# Patient Record
Sex: FEMALE | Race: WHITE | Employment: FULL TIME | URBAN - METROPOLITAN AREA
[De-identification: names, ages, dates, MRNs, and addresses within clinical notes are randomized per-mention and may not be internally consistent; named-entity substitution may affect disease eponyms.]

---

## 2017-02-08 ENCOUNTER — TELEPHONE (OUTPATIENT)
Dept: FAMILY MEDICINE CLINIC | Age: 37
End: 2017-02-08

## 2017-06-02 ENCOUNTER — TELEPHONE (OUTPATIENT)
Dept: FAMILY MEDICINE CLINIC | Age: 37
End: 2017-06-02

## 2017-06-02 DIAGNOSIS — G89.29 CHRONIC CERVICAL PAIN: Primary | ICD-10-CM

## 2017-06-02 DIAGNOSIS — M54.2 CHRONIC CERVICAL PAIN: Primary | ICD-10-CM

## 2019-01-26 ENCOUNTER — HOSPITAL ENCOUNTER (EMERGENCY)
Age: 39
Discharge: HOME | End: 2019-01-26
Attending: EMERGENCY MEDICINE
Payer: COMMERCIAL

## 2019-01-26 VITALS
WEIGHT: 220 LBS | TEMPERATURE: 98 F | SYSTOLIC BLOOD PRESSURE: 148 MMHG | BODY MASS INDEX: 35.51 KG/M2 | OXYGEN SATURATION: 98 % | DIASTOLIC BLOOD PRESSURE: 80 MMHG | RESPIRATION RATE: 16 BRPM | HEART RATE: 90 BPM

## 2019-01-26 DIAGNOSIS — G43.909 MIGRAINE WITHOUT STATUS MIGRAINOSUS, NOT INTRACTABLE, UNSPECIFIED MIGRAINE TYPE: Primary | ICD-10-CM

## 2019-01-26 DIAGNOSIS — Z86.69 HX OF MIGRAINES: ICD-10-CM

## 2019-01-26 LAB
A/G RATIO: 1.4 (ref 1.1–2.2)
ALBUMIN SERPL-MCNC: 4 G/DL (ref 3.4–5)
ALP BLD-CCNC: 59 U/L (ref 40–129)
ALT SERPL-CCNC: 8 U/L (ref 10–40)
ANION GAP SERPL CALCULATED.3IONS-SCNC: 10 MMOL/L (ref 3–16)
AST SERPL-CCNC: 15 U/L (ref 15–37)
BASOPHILS ABSOLUTE: 0.1 K/UL (ref 0–0.2)
BASOPHILS RELATIVE PERCENT: 0.7 %
BILIRUB SERPL-MCNC: 0.3 MG/DL (ref 0–1)
BUN BLDV-MCNC: 13 MG/DL (ref 7–20)
CALCIUM SERPL-MCNC: 9.3 MG/DL (ref 8.3–10.6)
CHLORIDE BLD-SCNC: 104 MMOL/L (ref 99–110)
CO2: 25 MMOL/L (ref 21–32)
CREAT SERPL-MCNC: 0.7 MG/DL (ref 0.6–1.1)
EOSINOPHILS ABSOLUTE: 0.4 K/UL (ref 0–0.6)
EOSINOPHILS RELATIVE PERCENT: 3.6 %
GFR AFRICAN AMERICAN: >60
GFR NON-AFRICAN AMERICAN: >60
GLOBULIN: 2.9 G/DL
GLUCOSE BLD-MCNC: 93 MG/DL (ref 70–99)
HCG QUALITATIVE: NEGATIVE
HCT VFR BLD CALC: 38 % (ref 36–48)
HEMOGLOBIN: 12.7 G/DL (ref 12–16)
LYMPHOCYTES ABSOLUTE: 1.9 K/UL (ref 1–5.1)
LYMPHOCYTES RELATIVE PERCENT: 17.3 %
MCH RBC QN AUTO: 27.8 PG (ref 26–34)
MCHC RBC AUTO-ENTMCNC: 33.5 G/DL (ref 31–36)
MCV RBC AUTO: 83.1 FL (ref 80–100)
MONOCYTES ABSOLUTE: 0.6 K/UL (ref 0–1.3)
MONOCYTES RELATIVE PERCENT: 5.4 %
NEUTROPHILS ABSOLUTE: 7.9 K/UL (ref 1.7–7.7)
NEUTROPHILS RELATIVE PERCENT: 73 %
PDW BLD-RTO: 13.1 % (ref 12.4–15.4)
PLATELET # BLD: 382 K/UL (ref 135–450)
PMV BLD AUTO: 7.5 FL (ref 5–10.5)
POTASSIUM SERPL-SCNC: 3.9 MMOL/L (ref 3.5–5.1)
RBC # BLD: 4.58 M/UL (ref 4–5.2)
SODIUM BLD-SCNC: 139 MMOL/L (ref 136–145)
TOTAL PROTEIN: 6.9 G/DL (ref 6.4–8.2)
WBC # BLD: 10.8 K/UL (ref 4–11)

## 2019-01-26 PROCEDURE — 2580000003 HC RX 258: Performed by: PHYSICIAN ASSISTANT

## 2019-01-26 PROCEDURE — 96375 TX/PRO/DX INJ NEW DRUG ADDON: CPT

## 2019-01-26 PROCEDURE — 6360000002 HC RX W HCPCS: Performed by: PHYSICIAN ASSISTANT

## 2019-01-26 PROCEDURE — 6370000000 HC RX 637 (ALT 250 FOR IP): Performed by: PHYSICIAN ASSISTANT

## 2019-01-26 PROCEDURE — 85025 COMPLETE CBC W/AUTO DIFF WBC: CPT

## 2019-01-26 PROCEDURE — 84703 CHORIONIC GONADOTROPIN ASSAY: CPT

## 2019-01-26 PROCEDURE — 99284 EMERGENCY DEPT VISIT MOD MDM: CPT

## 2019-01-26 PROCEDURE — 96374 THER/PROPH/DIAG INJ IV PUSH: CPT

## 2019-01-26 PROCEDURE — 96361 HYDRATE IV INFUSION ADD-ON: CPT

## 2019-01-26 PROCEDURE — 80053 COMPREHEN METABOLIC PANEL: CPT

## 2019-01-26 RX ORDER — IBUPROFEN 600 MG/1
600 TABLET ORAL EVERY 6 HOURS PRN
Qty: 10 TABLET | Refills: 0 | Status: SHIPPED | OUTPATIENT
Start: 2019-01-26 | End: 2021-06-30

## 2019-01-26 RX ORDER — METOCLOPRAMIDE HYDROCHLORIDE 5 MG/ML
10 INJECTION INTRAMUSCULAR; INTRAVENOUS ONCE
Status: COMPLETED | OUTPATIENT
Start: 2019-01-26 | End: 2019-01-26

## 2019-01-26 RX ORDER — BUTALBITAL, ACETAMINOPHEN AND CAFFEINE 50; 325; 40 MG/1; MG/1; MG/1
2 TABLET ORAL ONCE
Status: COMPLETED | OUTPATIENT
Start: 2019-01-26 | End: 2019-01-26

## 2019-01-26 RX ORDER — ONDANSETRON 2 MG/ML
4 INJECTION INTRAMUSCULAR; INTRAVENOUS ONCE
Status: COMPLETED | OUTPATIENT
Start: 2019-01-26 | End: 2019-01-26

## 2019-01-26 RX ORDER — 0.9 % SODIUM CHLORIDE 0.9 %
1000 INTRAVENOUS SOLUTION INTRAVENOUS ONCE
Status: COMPLETED | OUTPATIENT
Start: 2019-01-26 | End: 2019-01-26

## 2019-01-26 RX ORDER — LIDOCAINE HYDROCHLORIDE 10 MG/ML
5 INJECTION, SOLUTION INFILTRATION; PERINEURAL ONCE
Status: DISCONTINUED | OUTPATIENT
Start: 2019-01-26 | End: 2019-01-26 | Stop reason: HOSPADM

## 2019-01-26 RX ORDER — BUTALBITAL, ACETAMINOPHEN AND CAFFEINE 50; 325; 40 MG/1; MG/1; MG/1
1 TABLET ORAL EVERY 6 HOURS PRN
Qty: 10 TABLET | Refills: 0 | Status: SHIPPED | OUTPATIENT
Start: 2019-01-26 | End: 2021-06-30

## 2019-01-26 RX ORDER — DIPHENHYDRAMINE HYDROCHLORIDE 50 MG/ML
25 INJECTION INTRAMUSCULAR; INTRAVENOUS ONCE
Status: COMPLETED | OUTPATIENT
Start: 2019-01-26 | End: 2019-01-26

## 2019-01-26 RX ORDER — KETOROLAC TROMETHAMINE 30 MG/ML
30 INJECTION, SOLUTION INTRAMUSCULAR; INTRAVENOUS ONCE
Status: COMPLETED | OUTPATIENT
Start: 2019-01-26 | End: 2019-01-26

## 2019-01-26 RX ORDER — PROMETHAZINE HYDROCHLORIDE 25 MG/1
12.5-25 TABLET ORAL EVERY 8 HOURS PRN
Qty: 10 TABLET | Refills: 0 | Status: SHIPPED | OUTPATIENT
Start: 2019-01-26 | End: 2019-02-02

## 2019-01-26 RX ADMIN — BUTALBITAL, ACETAMINOPHEN, AND CAFFEINE 2 TABLET: 50; 325; 40 TABLET ORAL at 15:18

## 2019-01-26 RX ADMIN — METOCLOPRAMIDE 10 MG: 5 INJECTION, SOLUTION INTRAMUSCULAR; INTRAVENOUS at 13:33

## 2019-01-26 RX ADMIN — KETOROLAC TROMETHAMINE 30 MG: 30 INJECTION, SOLUTION INTRAMUSCULAR; INTRAVENOUS at 13:33

## 2019-01-26 RX ADMIN — DIPHENHYDRAMINE HYDROCHLORIDE 25 MG: 50 INJECTION, SOLUTION INTRAMUSCULAR; INTRAVENOUS at 13:33

## 2019-01-26 RX ADMIN — SODIUM CHLORIDE 1000 ML: 9 INJECTION, SOLUTION INTRAVENOUS at 13:20

## 2019-01-26 RX ADMIN — ONDANSETRON 4 MG: 2 INJECTION INTRAMUSCULAR; INTRAVENOUS at 15:18

## 2019-01-26 ASSESSMENT — PAIN SCALES - GENERAL
PAINLEVEL_OUTOF10: 9

## 2019-07-30 ENCOUNTER — HOSPITAL ENCOUNTER (EMERGENCY)
Age: 39
Discharge: LEFT BEFORE DISCHARGE | End: 2019-07-30
Payer: COMMERCIAL

## 2019-07-30 VITALS
DIASTOLIC BLOOD PRESSURE: 75 MMHG | HEART RATE: 91 BPM | SYSTOLIC BLOOD PRESSURE: 115 MMHG | HEIGHT: 66 IN | TEMPERATURE: 98.3 F | BODY MASS INDEX: 30.53 KG/M2 | WEIGHT: 190 LBS | OXYGEN SATURATION: 99 % | RESPIRATION RATE: 16 BRPM

## 2019-07-30 DIAGNOSIS — M79.643 PAIN OF HAND, UNSPECIFIED LATERALITY: Primary | ICD-10-CM

## 2019-07-30 PROCEDURE — 4500000002 HC ER NO CHARGE

## 2019-07-30 ASSESSMENT — PAIN DESCRIPTION - DESCRIPTORS: DESCRIPTORS: THROBBING;SHARP

## 2019-07-30 ASSESSMENT — PAIN DESCRIPTION - LOCATION: LOCATION: HAND

## 2019-07-30 ASSESSMENT — PAIN DESCRIPTION - FREQUENCY: FREQUENCY: CONTINUOUS

## 2019-07-30 ASSESSMENT — PAIN DESCRIPTION - ORIENTATION: ORIENTATION: RIGHT

## 2019-07-30 ASSESSMENT — PAIN DESCRIPTION - PAIN TYPE: TYPE: ACUTE PAIN

## 2019-07-30 ASSESSMENT — PAIN SCALES - GENERAL: PAINLEVEL_OUTOF10: 5

## 2021-06-30 ENCOUNTER — HOSPITAL ENCOUNTER (EMERGENCY)
Age: 41
Discharge: HOME | End: 2021-06-30
Payer: COMMERCIAL

## 2021-06-30 ENCOUNTER — APPOINTMENT (OUTPATIENT)
Dept: GENERAL RADIOLOGY | Age: 41
End: 2021-06-30
Payer: COMMERCIAL

## 2021-06-30 VITALS
RESPIRATION RATE: 16 BRPM | WEIGHT: 195 LBS | SYSTOLIC BLOOD PRESSURE: 115 MMHG | OXYGEN SATURATION: 97 % | HEIGHT: 66 IN | BODY MASS INDEX: 31.34 KG/M2 | HEART RATE: 82 BPM | TEMPERATURE: 97.1 F | DIASTOLIC BLOOD PRESSURE: 78 MMHG

## 2021-06-30 DIAGNOSIS — M76.62 TENDONITIS, ACHILLES, LEFT: Primary | ICD-10-CM

## 2021-06-30 PROCEDURE — 73610 X-RAY EXAM OF ANKLE: CPT

## 2021-06-30 PROCEDURE — 99283 EMERGENCY DEPT VISIT LOW MDM: CPT

## 2021-06-30 RX ORDER — NAPROXEN 500 MG/1
500 TABLET ORAL 2 TIMES DAILY WITH MEALS
Qty: 20 TABLET | Refills: 0 | Status: SHIPPED | OUTPATIENT
Start: 2021-06-30 | End: 2021-07-10

## 2021-06-30 ASSESSMENT — PAIN DESCRIPTION - ORIENTATION: ORIENTATION: LEFT

## 2021-06-30 ASSESSMENT — PAIN DESCRIPTION - LOCATION: LOCATION: ANKLE

## 2021-06-30 ASSESSMENT — PAIN DESCRIPTION - FREQUENCY: FREQUENCY: CONTINUOUS

## 2021-06-30 ASSESSMENT — PAIN SCALES - GENERAL: PAINLEVEL_OUTOF10: 4

## 2021-06-30 NOTE — ED PROVIDER NOTES
[address] ED  EMERGENCY DEPARTMENT ENCOUNTER        Pt Name: Manas Haq  MRN: [id number]  [place] 1980  Date of evaluation: 6/30/2021  Provider: Ann Arreguin PA-C  PCP: No primary care provider on file. Shared Visit or Autonomous Visit: GINA. I have evaluated this patient. My supervising physician was available for consultation. CHIEF COMPLAINT       Chief Complaint   Patient presents with    Ankle Pain     and achilles pain left foot       HISTORY OF PRESENT ILLNESS   (Location/Symptom, Timing/Onset, Context/Setting, Quality, Duration, Modifying Factors, Severity)  Note limiting factors. Manas Haq is a 47 y.o. female presenting to the emergency department for evaluation of pain at her posterior left ankle Achilles area states has bothered her off and on for years every few months will get pain but usually goes away pretty quickly pain started again yesterday and not going away feels worse than usual.  No falls or trauma. No calf pain or swelling. No discoloration. Flexing and extending foot. Intact sensation. The history is provided by the patient. Ankle Problem  Location:  Ankle  Injury: no    Ankle location:  L ankle  Pain details:     Onset quality:  Gradual    Progression:  Worsening  Chronicity:  Recurrent  Worsened by: Activity, extension and flexion  Associated symptoms: no fever and no numbness        Nursing Notes were reviewed    REVIEW OF SYSTEMS    (2-9 systems for level 4, 10 or more for level 5)     Review of Systems   Constitutional: Negative for fever. Musculoskeletal:        Left foot/ ankle pain   Skin: Negative for color change and wound. Neurological: Negative for numbness. Positives and Pertinent negatives as per HPI.        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     Pain management contract agreement     Viral meningitis 2010 SURGICAL HISTORY     Past Surgical History:   Procedure Laterality Date    CARDIAC SURGERY      CARDIAC SURGERY      repaired hole in heart at age 5 year    ELBOW SURGERY      broken, plates in elbow   [address]       Discharge Medication List as of 6/30/2021 12:47 PM            ALLERGIES     Penicillins    FAMILYHISTORY       Family History   Problem Relation Age of Onset    Alcohol Abuse Mother           SOCIAL HISTORY       Social History     Socioeconomic History    Marital status: Single     Spouse name: None    Number of children: None    Years of education: None    Highest education level: None   Occupational History    None   Tobacco Use    Smoking status: Never Smoker    Smokeless tobacco: Never Used   Vaping Use    Vaping Use: Never used   Substance and Sexual Activity    Alcohol use: No    Drug use: No    Sexual activity: Yes     Partners: Male   Other Topics Concern    None   Social History Narrative    None     Social Determinants of Health     Financial Resource Strain:     Difficulty of Paying Living Expenses:    Food Insecurity:     Worried About Running Out of Food in the Last Year:     Ran Out of Food in the Last Year:    Transportation Needs:     Lack of Transportation (Medical):      Lack of Transportation (Non-Medical):    Physical Activity:     Days of Exercise per Week:     Minutes of Exercise per Session:    Stress:     Feeling of Stress :    Social Connections:     Frequency of Communication with Friends and Family:     Frequency of Social Gatherings with Friends and Family:     Attends Voodoo Services:     Active Member of Clubs or Organizations:     Attends Club or Organization Meetings:     Marital Status:    Intimate Partner Violence:     Fear of Current or Ex-Partner:     Emotionally Abused:     Physically Abused:     Sexually Abused:        SCREENINGS             PHYSICAL EXAM    (up to 7 for level 4, 8 or more for level 5)     ED Triage Vitals [06/30/21 1057]   BP Temp Temp src Pulse Resp SpO2 Height Weight   115/78 97.1 °F (36.2 °C) -- 82 16 97 % 5' 6\" (1.676 m) 195 lb (88.5 kg)       Physical Exam  Vitals and nursing note reviewed. Constitutional:       Appearance: She is well-developed. She is not ill-appearing or toxic-appearing. HENT:      Head: Normocephalic and atraumatic. Cardiovascular:      Pulses: Normal pulses. Dorsalis pedis pulses are 2+ on the left side. Posterior tibial pulses are 2+ on the left side. Pulmonary:      Effort: Pulmonary effort is normal. No respiratory distress. Musculoskeletal:      Comments: Tenderness to palpation posterior left ankle over Achilles with mild swelling. No palpable defect. No erythema. No open wounds. No palpable crepitus. Full flexion-extension of foot. Intact sensation distal pulses 2+ cap refill less than 2 seconds. No calf tenderness or swelling. Skin:     General: Skin is warm and dry. Capillary Refill: Capillary refill takes less than 2 seconds. Neurological:      Mental Status: She is alert and oriented to person, place, and time. Sensory: Sensation is intact. Motor: Motor function is intact. No abnormal muscle tone. Psychiatric:         Behavior: Behavior normal.         DIAGNOSTIC RESULTS   LABS:    Labs Reviewed - No data to display    All other labs were within normal range or not returned as of this dictation. EKG: All EKG's are interpreted by the Emergency Department Physician in the absence of a cardiologist.  Please see their note for interpretation of EKG.       RADIOLOGY:   Non-plain film images such as CT, Ultrasound and MRI are read by the radiologist. Plain radiographic images are visualized andpreliminarily interpreted by the  ED Provider with the below findings:        Interpretation perthe Radiologist below, if available at the time of this note:    XR ANKLE LEFT (MIN 3 VIEWS)   Final Result   No evidence of acute osseous abnormality. XR ANKLE LEFT (MIN 3 VIEWS)    Result Date: 7/1/2021  EXAMINATION: THREE XRAY VIEWS OF THE LEFT ANKLE 6/30/2021 11:36 am COMPARISON: None. HISTORY: ORDERING SYSTEM PROVIDED HISTORY: pain at Achilles off and on x months TECHNOLOGIST PROVIDED HISTORY: Reason for exam:->pain at Achilles off and on x months Reason for Exam: achilles pain, no known injury Acuity: Acute Type of Exam: Initial FINDINGS: There is mild soft tissue swelling about the ankle. Question of presence of very minimal spurring along posterior margin of the calcaneus. No other significant bone or joint abnormality is identified. No evidence of significant joint effusion. No evidence of acute fracture or dislocation. No evidence of acute osseous abnormality. PROCEDURES   Unless otherwise noted below, none     Procedures    CRITICAL CARE TIME   N/A    CONSULTS:  None      EMERGENCY DEPARTMENT COURSE and DIFFERENTIAL DIAGNOSIS/MDM:   Vitals:    Vitals:    06/30/21 1057   BP: 115/78   Pulse: 82   Resp: 16   Temp: 97.1 °F (36.2 °C)   SpO2: 97%   Weight: 195 lb (88.5 kg)   Height: 5' 6\" (1.676 m)       Patient was given thefollowing medications:  Medications - No data to display    12:25 PM EDT  X-ray obtained no fracture or dislocation questionable minimal spurring calcaneus. Suspect Achilles tendinitis. Advise rest ice elevate anti-inflammatories crutches as needed close follow-up. Advise returning for worsening. I estimate there is LOW risk for FRACTURE, COMPARTMENT SYNDROME, DEEP VENOUS THROMBOSIS, SEPTIC ARTHRITIS, TENDON OR NEUROVASCULAR INJURY, thus I consider the discharge disposition reasonable. FINAL IMPRESSION      1.  Tendonitis, Achilles, left          DISPOSITION/PLAN   DISPOSITION     PATIENT REFERREDTO:  Smiley Bah MD  [address] [facility] [address]  [phone]    Call in 1 day  [facility] orthopedics call for follow-up appointment    [place] ([facility]) Pre-Services  [phone]    Primary care referral      DISCHARGE MEDICATIONS:  Discharge Medication List as of 6/30/2021 12:47 PM      START taking these medications    Details   naproxen (NAPROSYN) 500 MG tablet Take 1 tablet by mouth 2 times daily (with meals) for 10 days, Disp-20 tablet, R-0Print             DISCONTINUED MEDICATIONS:  Discharge Medication List as of 6/30/2021 12:47 PM      STOP taking these medications       butalbital-acetaminophen-caffeine (FIORICET, ESGIC) -40 MG per tablet Comments:   Reason for Stopping:         ibuprofen (ADVIL;MOTRIN) 600 MG tablet Comments:   Reason for Stopping:                      (Please note that portions ofthis note were completed with a voice recognition program.  Efforts were made to edit the dictations but occasionally words are mis-transcribed.)    Julian Judd PA-C (electronically signed)            Isabelle Reardon PA-C  07/02/21 0031

## 2021-07-02 ASSESSMENT — ENCOUNTER SYMPTOMS: COLOR CHANGE: 0

## 2021-10-19 ENCOUNTER — HOSPITAL ENCOUNTER (EMERGENCY)
Age: 41
Discharge: HOME | End: 2021-10-19
Attending: STUDENT IN AN ORGANIZED HEALTH CARE EDUCATION/TRAINING PROGRAM
Payer: COMMERCIAL

## 2021-10-19 ENCOUNTER — APPOINTMENT (OUTPATIENT)
Dept: CT IMAGING | Age: 41
End: 2021-10-19
Payer: COMMERCIAL

## 2021-10-19 ENCOUNTER — APPOINTMENT (OUTPATIENT)
Dept: GENERAL RADIOLOGY | Age: 41
End: 2021-10-19
Payer: COMMERCIAL

## 2021-10-19 VITALS
WEIGHT: 195 LBS | BODY MASS INDEX: 31.34 KG/M2 | HEART RATE: 75 BPM | RESPIRATION RATE: 16 BRPM | TEMPERATURE: 97.6 F | HEIGHT: 66 IN | SYSTOLIC BLOOD PRESSURE: 115 MMHG | DIASTOLIC BLOOD PRESSURE: 71 MMHG | OXYGEN SATURATION: 99 %

## 2021-10-19 DIAGNOSIS — R06.02 SHORTNESS OF BREATH: ICD-10-CM

## 2021-10-19 DIAGNOSIS — U07.1 COVID-19: Primary | ICD-10-CM

## 2021-10-19 LAB
A/G RATIO: 1 (ref 1.1–2.2)
ALBUMIN SERPL-MCNC: 4 G/DL (ref 3.4–5)
ALP BLD-CCNC: 64 U/L (ref 40–129)
ALT SERPL-CCNC: 8 U/L (ref 10–40)
ANION GAP SERPL CALCULATED.3IONS-SCNC: 10 MMOL/L (ref 3–16)
AST SERPL-CCNC: 24 U/L (ref 15–37)
BASOPHILS ABSOLUTE: 0.1 K/UL (ref 0–0.2)
BASOPHILS RELATIVE PERCENT: 0.9 %
BILIRUB SERPL-MCNC: 0.4 MG/DL (ref 0–1)
BUN BLDV-MCNC: 11 MG/DL (ref 7–20)
CALCIUM SERPL-MCNC: 9 MG/DL (ref 8.3–10.6)
CHLORIDE BLD-SCNC: 103 MMOL/L (ref 99–110)
CO2: 24 MMOL/L (ref 21–32)
CREAT SERPL-MCNC: <0.5 MG/DL (ref 0.6–1.1)
EKG ATRIAL RATE: 61 BPM
EKG DIAGNOSIS: NORMAL
EKG P AXIS: 80 DEGREES
EKG P-R INTERVAL: 208 MS
EKG Q-T INTERVAL: 442 MS
EKG QRS DURATION: 102 MS
EKG QTC CALCULATION (BAZETT): 444 MS
EKG R AXIS: 63 DEGREES
EKG T AXIS: 51 DEGREES
EKG VENTRICULAR RATE: 61 BPM
EOSINOPHILS ABSOLUTE: 0.3 K/UL (ref 0–0.6)
EOSINOPHILS RELATIVE PERCENT: 5.3 %
GFR AFRICAN AMERICAN: >60
GFR NON-AFRICAN AMERICAN: >60
GLOBULIN: 4 G/DL
GLUCOSE BLD-MCNC: 80 MG/DL (ref 70–99)
HCG QUALITATIVE: NEGATIVE
HCT VFR BLD CALC: 42.1 % (ref 36–48)
HEMOGLOBIN: 13.9 G/DL (ref 12–16)
LYMPHOCYTES ABSOLUTE: 2.5 K/UL (ref 1–5.1)
LYMPHOCYTES RELATIVE PERCENT: 37.8 %
MCH RBC QN AUTO: 27.2 PG (ref 26–34)
MCHC RBC AUTO-ENTMCNC: 32.9 G/DL (ref 31–36)
MCV RBC AUTO: 82.7 FL (ref 80–100)
MONOCYTES ABSOLUTE: 0.4 K/UL (ref 0–1.3)
MONOCYTES RELATIVE PERCENT: 6.3 %
NEUTROPHILS ABSOLUTE: 3.3 K/UL (ref 1.7–7.7)
NEUTROPHILS RELATIVE PERCENT: 49.7 %
PDW BLD-RTO: 12.9 % (ref 12.4–15.4)
PLATELET # BLD: 479 K/UL (ref 135–450)
PMV BLD AUTO: 7.2 FL (ref 5–10.5)
POTASSIUM REFLEX MAGNESIUM: 5.3 MMOL/L (ref 3.5–5.1)
POTASSIUM SERPL-SCNC: 4.1 MMOL/L (ref 3.5–5.1)
PROCALCITONIN: 0.03 NG/ML (ref 0–0.15)
RBC # BLD: 5.09 M/UL (ref 4–5.2)
SODIUM BLD-SCNC: 137 MMOL/L (ref 136–145)
TOTAL PROTEIN: 8 G/DL (ref 6.4–8.2)
TROPONIN: <0.01 NG/ML
WBC # BLD: 6.6 K/UL (ref 4–11)

## 2021-10-19 PROCEDURE — 99285 EMERGENCY DEPT VISIT HI MDM: CPT

## 2021-10-19 PROCEDURE — 80053 COMPREHEN METABOLIC PANEL: CPT

## 2021-10-19 PROCEDURE — 84484 ASSAY OF TROPONIN QUANT: CPT

## 2021-10-19 PROCEDURE — 71260 CT THORAX DX C+: CPT

## 2021-10-19 PROCEDURE — 85025 COMPLETE CBC W/AUTO DIFF WBC: CPT

## 2021-10-19 PROCEDURE — 84145 PROCALCITONIN (PCT): CPT

## 2021-10-19 PROCEDURE — 84132 ASSAY OF SERUM POTASSIUM: CPT

## 2021-10-19 PROCEDURE — 84703 CHORIONIC GONADOTROPIN ASSAY: CPT

## 2021-10-19 PROCEDURE — 71046 X-RAY EXAM CHEST 2 VIEWS: CPT

## 2021-10-19 PROCEDURE — 93005 ELECTROCARDIOGRAM TRACING: CPT | Performed by: STUDENT IN AN ORGANIZED HEALTH CARE EDUCATION/TRAINING PROGRAM

## 2021-10-19 PROCEDURE — 6360000004 HC RX CONTRAST MEDICATION: Performed by: STUDENT IN AN ORGANIZED HEALTH CARE EDUCATION/TRAINING PROGRAM

## 2021-10-19 PROCEDURE — 6370000000 HC RX 637 (ALT 250 FOR IP): Performed by: STUDENT IN AN ORGANIZED HEALTH CARE EDUCATION/TRAINING PROGRAM

## 2021-10-19 PROCEDURE — 93010 ELECTROCARDIOGRAM REPORT: CPT | Performed by: INTERNAL MEDICINE

## 2021-10-19 RX ORDER — BUPRENORPHINE HYDROCHLORIDE 8 MG/1
8 TABLET SUBLINGUAL DAILY
COMMUNITY

## 2021-10-19 RX ORDER — ALBUTEROL SULFATE 90 UG/1
2 AEROSOL, METERED RESPIRATORY (INHALATION) ONCE
Status: COMPLETED | OUTPATIENT
Start: 2021-10-19 | End: 2021-10-19

## 2021-10-19 RX ORDER — AZITHROMYCIN 250 MG/1
TABLET, FILM COATED ORAL
Qty: 6 TABLET | Refills: 0 | Status: SHIPPED | OUTPATIENT
Start: 2021-10-19 | End: 2021-10-24

## 2021-10-19 RX ORDER — GUAIFENESIN 600 MG/1
600 TABLET, EXTENDED RELEASE ORAL 2 TIMES DAILY
Qty: 20 TABLET | Refills: 0 | Status: SHIPPED | OUTPATIENT
Start: 2021-10-19 | End: 2021-10-29

## 2021-10-19 RX ORDER — ZINC SULFATE 50(220)MG
50 CAPSULE ORAL DAILY
Qty: 7 CAPSULE | Refills: 0 | Status: SHIPPED | OUTPATIENT
Start: 2021-10-19 | End: 2021-10-26

## 2021-10-19 RX ORDER — ASCORBIC ACID 500 MG
500 TABLET ORAL 2 TIMES DAILY
Qty: 14 TABLET | Refills: 0 | Status: SHIPPED | OUTPATIENT
Start: 2021-10-19 | End: 2021-10-26

## 2021-10-19 RX ADMIN — IOPAMIDOL 85 ML: 755 INJECTION, SOLUTION INTRAVENOUS at 15:04

## 2021-10-19 RX ADMIN — Medication 2 PUFF: at 16:40

## 2021-10-19 ASSESSMENT — PAIN SCALES - GENERAL
PAINLEVEL_OUTOF10: 5
PAINLEVEL_OUTOF10: 4
PAINLEVEL_OUTOF10: 3

## 2021-10-19 ASSESSMENT — PAIN DESCRIPTION - DESCRIPTORS: DESCRIPTORS: TIGHTNESS

## 2021-10-19 ASSESSMENT — PAIN DESCRIPTION - LOCATION: LOCATION: CHEST

## 2021-10-19 NOTE — ED TRIAGE NOTES
Chief Complaint   Patient presents with    Shortness of Breath     pt states she is sob and wheezing x 1 week. states she tested positive for covid around 3-4 weeks ago. was asymptomatic at that time.

## 2021-10-19 NOTE — ED PROVIDER NOTES
[address] ED      CHIEF COMPLAINT  Shortness of breath     HISTORY OF PRESENT ILLNESS  Brittany Villareal is a 47 y.o. female with a past medical history of bulging disks, who presents to the ED complaining of shortness of breath. Patient tested positive for coronavirus approximately 4 weeks ago. She was asymptomatic at that time. She reports shortness of breath and wheezing for approximately 1 week. No palliative or provocative factors. She reports nonproductive cough. She denies chest pain. She denies fever or diarrhea. Patient does not smoke. She denies any leg swelling, hemoptysis, previous blood clot or active cancer, recent travel or surgery. Patient has been vaccinated for coronavirus. Old records reviewed: No pertinent information noted. No other complaints, modifying factors or associated symptoms. I have reviewed the following from the nursing documentation.     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     Pain management contract agreement     Viral meningitis 2010     Past Surgical History:   Procedure Laterality Date    CARDIAC SURGERY      CARDIAC SURGERY      repaired hole in heart at age 5 year    ELBOW SURGERY      broken, plates in elbow    TUBAL LIGATION       Family History   Problem Relation Age of Onset    Alcohol Abuse Mother      Social History     Socioeconomic History    Marital status: Single     Spouse name: Not on file    Number of children: Not on file    Years of education: Not on file    Highest education level: Not on file   Occupational History    Not on file   Tobacco Use    Smoking status: Never Smoker    Smokeless tobacco: Never Used   Vaping Use    Vaping Use: Never used   Substance and Sexual Activity    Alcohol use: No    Drug use: No    Sexual activity: Yes     Partners: Male   Other Topics Concern    Not on file   Social History Narrative    Not on file Social Determinants of Health     Financial Resource Strain:     Difficulty of Paying Living Expenses:    Food Insecurity:     Worried About Running Out of Food in the Last Year:     [address] in the Last Year:    Transportation Needs:     Lack of Transportation (Medical):  Lack of Transportation (Non-Medical):    Physical Activity:     Days of Exercise per Week:     Minutes of Exercise per Session:    Stress:     Feeling of Stress :    Social Connections:     Frequency of Communication with Friends and Family:     Frequency of Social Gatherings with Friends and Family:     Attends Anglican Services:     Active Member of Clubs or Organizations:     Attends Club or Organization Meetings:     Marital Status:    Intimate Partner Violence:     Fear of Current or Ex-Partner:     Emotionally Abused:     Physically Abused:     Sexually Abused:      No current facility-administered medications for this encounter. Current Outpatient Medications   Medication Sig Dispense Refill    buprenorphine (SUBUTEX) 8 MG SUBL SL tablet Place 8 mg under the tongue daily.  zinc sulfate (ZINCATE) 220 (50 Zn) MG capsule Take 1 capsule by mouth daily for 7 days 7 capsule 0    ascorbic acid (VITAMIN C) 500 MG tablet Take 1 tablet by mouth 2 times daily for 7 days 14 tablet 0    guaiFENesin (MUCINEX) 600 MG extended release tablet Take 1 tablet by mouth 2 times daily for 10 days 20 tablet 0    azithromycin (ZITHROMAX) 250 MG tablet Take 2 tablets by mouth daily for 1 day, THEN 1 tablet daily for 4 days. 6 tablet 0    naproxen (NAPROSYN) 500 MG tablet Take 1 tablet by mouth 2 times daily (with meals) for 10 days 20 tablet 0     Allergies   Allergen Reactions    Penicillins        REVIEW OF SYSTEMS  All systems reviewed, pertinent positives per HPI otherwise noted to be negative.     PHYSICAL EXAM  /71   Pulse 75   Temp 97.6 °F (36.4 °C) (Oral)   Resp 16   Ht 5' 6\" (1.676 m)   Wt 195 lb (88.5 kg)   [facility] 09/15/2021   SpO2 99%   BMI 31.47 kg/m²    GENERAL APPEARANCE: Awake and alert. Cooperative. no distress. HENT: Normocephalic. Atraumatic. Mucous membranes are moist  NECK: Supple. Full range of motion of the neck without stiffness or pain. EYES: PERRL. EOM's grossly intact. HEART/CHEST: RRR. No murmurs. Chest wall is not tender to palpation. LUNGS: Respirations unlabored. CTAB. Good air exchange. Speaking comfortably in full sentences. ABDOMEN: No tenderness. Soft. Non-distended. No masses. No organomegaly. No guarding or rebound. MUSCULOSKELETAL: No extremity edema. Compartments soft. No deformity. No tenderness in the extremities. All extremities neurovascularly intact. SKIN: Warm and dry. No acute rashes. NEUROLOGICAL: Alert and oriented. No gross facial drooping. Strength 5/5, sensation intact. PSYCHIATRIC: Normal mood and affect. LABS  I have reviewed all labs for this visit.    Results for orders placed or performed during the hospital encounter of 10/19/21   CBC Auto Differential   Result Value Ref Range    WBC 6.6 4.0 - 11.0 K/uL    RBC 5.09 4.00 - 5.20 M/uL    Hemoglobin 13.9 12.0 - 16.0 g/dL    Hematocrit 42.1 36.0 - 48.0 %    MCV 82.7 80.0 - 100.0 fL    MCH 27.2 26.0 - 34.0 pg    MCHC 32.9 31.0 - 36.0 g/dL    RDW 12.9 12.4 - 15.4 %    Platelets 324 (H) 936 - 450 K/uL    MPV 7.2 5.0 - 10.5 fL    Neutrophils % 49.7 %    Lymphocytes % 37.8 %    Monocytes % 6.3 %    Eosinophils % 5.3 %    Basophils % 0.9 %    Neutrophils Absolute 3.3 1.7 - 7.7 K/uL    Lymphocytes Absolute 2.5 1.0 - 5.1 K/uL    Monocytes Absolute 0.4 0.0 - 1.3 K/uL    Eosinophils Absolute 0.3 0.0 - 0.6 K/uL    Basophils Absolute 0.1 0.0 - 0.2 K/uL   Comprehensive Metabolic Panel w/ Reflex to MG   Result Value Ref Range    Sodium 137 136 - 145 mmol/L    Potassium reflex Magnesium 5.3 (H) 3.5 - 5.1 mmol/L    Chloride 103 99 - 110 mmol/L    CO2 24 21 - 32 mmol/L    Anion Gap 10 3 - 16    Glucose 80 70 - 99 mg/dL    BUN 11 7 - 20 mg/dL    CREATININE <0.5 (L) 0.6 - 1.1 mg/dL    GFR Non-African American >60 >60    GFR African American >60 >60    Calcium 9.0 8.3 - 10.6 mg/dL    Total Protein 8.0 6.4 - 8.2 g/dL    Albumin 4.0 3.4 - 5.0 g/dL    Albumin/Globulin Ratio 1.0 (L) 1.1 - 2.2    Total Bilirubin 0.4 0.0 - 1.0 mg/dL    Alkaline Phosphatase 64 40 - 129 U/L    ALT 8 (L) 10 - 40 U/L    AST 24 15 - 37 U/L    Globulin 4.0 Not Established g/dL   Troponin   Result Value Ref Range    Troponin <0.01 <0.01 ng/mL   HCG Qualitative, Serum   Result Value Ref Range    hCG Qual Negative Detects HCG level >10 MIU/mL   Potassium   Result Value Ref Range    Potassium 4.1 3.5 - 5.1 mmol/L   Procalcitonin   Result Value Ref Range    Procalcitonin 0.03 0.00 - 0.15 ng/mL   EKG 12 Lead   Result Value Ref Range    Ventricular Rate 61 BPM    Atrial Rate 61 BPM    P-R Interval 208 ms    QRS Duration 102 ms    Q-T Interval 442 ms    QTc Calculation (Bazett) 444 ms    P Axis 80 degrees    R Axis 63 degrees    T Axis 51 degrees    Diagnosis       Normal sinus rhythmRSR' or QR pattern in V1 suggests right ventricular conduction delayConfirmed by BOO Jones MD ([id number]) on 10/19/2021 5:31:12 PM       ECG  The Ekg interpreted by me shows  normal sinus rhythm with a rate of 61  Axis is   Normal  QTc is  within an acceptable range  Intervals and Durations are unremarkable. ST Segments: nonspecific changes  No previous for comparison    RADIOLOGY    CT CHEST PULMONARY EMBOLISM W CONTRAST   Final Result   1. Subtle left upper lobe ground-glass opacity favors an   infectious/inflammatory process. XR CHEST (2 VW)   Final Result   Small areas of bilateral atelectasis versus pneumonia                ED COURSE / MDM  Patient seen and evaluated. Old records reviewed and pertinent information included in HPI. Labs and imaging reviewed and results discussed with patient.       Overall well appearing patient, in no acute distress, presenting for shortness of breath. Physical exam unremarkable. Differential diagnosis includes but is not limited to: COVID, Pneumonia, pneumothorax, pleural effusion, ACS, CHF exacerbation, COPD exacerbation, asthma, pulmonary embolism, arrhythmia, anemia    EKG, laboratory studies, and imaging obtained. Workup showed:  ED Course as of Oct 23 1558   Tue Oct 19, 2021   1335 CXR: IMPRESSION:  Small areas of bilateral atelectasis versus pneumonia    [ER]   1527 Patient is not pregnant.    [ER]   1528 No leukocytosis, anemia, thrombocytopenia. [ER]   1528 Mild hyperkalemia on hemolyzed sample. Will repeat. No other electrolyte abnormalities or evidence of kidney dysfunction.    [ER]   1528 Liver function testing unremarkable. [ER]   1528 Troponin within normal limits, low suspicion for myocarditis or ACS. [ER]   1529 The Ekg interpreted by me shows  normal sinus rhythm with a rate of 61  Axis is   Normal  QTc is  within an acceptable range  Intervals and Durations are unremarkable. ST Segments: nonspecific changes  No previous for comparison    [ER]   1545 CT PE: IMPRESSION:  1. Subtle left upper lobe ground-glass opacity favors an infectious/inflammatory process. [ER]   1547 Repeat potassium nonhemolyzed is within normal limits    [ER]   1655 Discussed with lab, procalcitonin will be another 90 minutes. Discussed with patient, she is comfortable with discharge at this time. Patient provided with a prescription for antibiotics, I will contact the patient later to provide further guidance on whether or not she should fill the prescription for antibiotics.     [ER]      ED Course User Index  [ER] Reyes Brisker, MD         During the patient's ED course, the patient was given:  Medications   albuterol sulfate  (90 Base) MCG/ACT inhaler 2 puff (2 puffs Inhalation Given 10/19/21 1640)   iopamidol (ISOVUE-370) 76 % injection 85 mL (85 mLs IntraVENous Given 10/19/21 1504)      At this time I have low concern for pulmonary embolism. Patient has not had a previous blood clot. Patient denies other risk factors for pulmonary embolism. Patient does not have any evidence of DVT on exam.  Patient is low risk on PERC and Wells criteria. At this time, considering that risks associated with further work-up for pulmonary embolism outweigh the likelihood of this diagnosis. The patient was specifically advised their symptoms are consistent with COVID-19 infection, and they need to self-isolate at home and restrict any activities outside of their home except for seeking medical care, and to wear a facemask whenever around others. The patient was provided specific instructions related to COVID-19, along with recommendations for proper respiratory hygiene and instructions on prevention of transmission of infection to others. The patient was counseled to closely monitor their symptoms, and to return immediately to the Emergency Department for any difficulty breathing, confusion, dizziness or passing out, vomiting, chest pain, high fevers, weakness, or any other new or concerning symptoms. There is no evidence of emergent medical condition at this time, and the patient will be discharged in good condition. Evaluation overall reassuring. At this time, feel the patient is appropriate for discharge to follow-up with a primary care doctor. Patient feels comfortable with discharge at this time. Patient was provided with prescriptions for zinc, vitamin C, Mucinex, and azithromycin. Return precautions given. Encouraged PCP follow-up within 1 week. Patient discharged in stable condition. I estimate there is LOW risk for ACUTE CORONARY SYNDROME, PULMONARY EMBOLISM, PNEUMOTHORAX, RUPTURED ESOPHAGUS OR THORACIC AORTIC DISSECTION, thus I consider the discharge disposition reasonable. Lian Dickey and I have discussed the diagnosis and risks, and we agree with discharging home with close follow-up.  We also discussed returning to the Emergency Department immediately if new or worsening symptoms occur. We have discussed the symptoms which are most concerning that necessitate immediate return. Patient's imaging did show focal consolidation, however she did not have an elevated white blood cell count. Procalcitonin was pending at the time of discharge. Is within normal limits. I did call the patient on 10/23. She reports that her symptoms are stable. She had not filled the prescription for azithromycin. We discussed that given her symptoms have not improved, she could consider filling the prescription given the equivocal findings on imaging. CLINICAL IMPRESSION  1. COVID-19    2. Shortness of breath        Blood pressure 115/71, pulse 75, temperature 97.6 °F (36.4 °C), temperature source Oral, resp. rate 16, height 5' 6\" (1.676 m), weight 195 lb (88.5 kg), last menstrual period 09/15/2021, SpO2 99 %, not currently breastfeeding. DISPOSITION  Azra Cobb was discharged to home in stable condition. Patient was given scripts for the following medications. I counseled patient how to take these medications. Discharge Medication List as of 10/19/2021  4:55 PM      START taking these medications    Details   zinc sulfate (ZINCATE) 220 (50 Zn) MG capsule Take 1 capsule by mouth daily for 7 days, Disp-7 capsule, R-0Normal      ascorbic acid (VITAMIN C) 500 MG tablet Take 1 tablet by mouth 2 times daily for 7 days, Disp-14 tablet, R-0Normal      guaiFENesin (MUCINEX) 600 MG extended release tablet Take 1 tablet by mouth 2 times daily for 10 days, Disp-20 tablet, R-0Normal      azithromycin (ZITHROMAX) 250 MG tablet Take 2 tablets by mouth daily for 1 day, THEN 1 tablet daily for 4 days. , Disp-6 tablet, R-0Print             Follow-up with:  Carin Marinelli  [phone]  Schedule an appointment as soon as possible for a visit       [place] [facility] [place] [facility] ED  [address]

## 2021-10-19 NOTE — ED NOTES
Procalcitonin will take at least an hour to result / lab phone call.        Marzena Francis RN  10/19/21 9543

## 2021-10-19 NOTE — Clinical Note
López Calderon was seen and treated in our emergency department on 10/19/2021. She may return to work on 10/27/2021.  2 weeks after onset of symptoms     If you have any questions or concerns, please don't hesitate to call.       Linsey Vizcaino MD

## 2021-10-27 ENCOUNTER — VIRTUAL VISIT (OUTPATIENT)
Dept: PRIMARY CARE CLINIC | Age: 41
End: 2021-10-27
Payer: COMMERCIAL

## 2021-10-27 DIAGNOSIS — U07.1 COVID-19 VIRUS INFECTION: Primary | ICD-10-CM

## 2021-10-27 PROCEDURE — G8427 DOCREV CUR MEDS BY ELIG CLIN: HCPCS

## 2021-10-27 PROCEDURE — 99203 OFFICE O/P NEW LOW 30 MIN: CPT

## 2021-10-27 RX ORDER — BENZONATATE 200 MG/1
200 CAPSULE ORAL 3 TIMES DAILY PRN
Qty: 21 CAPSULE | Refills: 0 | Status: SHIPPED | OUTPATIENT
Start: 2021-10-27 | End: 2021-11-03

## 2021-10-27 ASSESSMENT — ENCOUNTER SYMPTOMS
DIARRHEA: 0
SHORTNESS OF BREATH: 1
SINUS PRESSURE: 1
COUGH: 1
TROUBLE SWALLOWING: 0
VOMITING: 0
NAUSEA: 0
SORE THROAT: 0

## 2021-10-27 ASSESSMENT — PATIENT HEALTH QUESTIONNAIRE - PHQ9
10. IF YOU CHECKED OFF ANY PROBLEMS, HOW DIFFICULT HAVE THESE PROBLEMS MADE IT FOR YOU TO DO YOUR WORK, TAKE CARE OF THINGS AT HOME, OR GET ALONG WITH OTHER PEOPLE: 1
7. TROUBLE CONCENTRATING ON THINGS, SUCH AS READING THE NEWSPAPER OR WATCHING TELEVISION: 0
SUM OF ALL RESPONSES TO PHQ QUESTIONS 1-9: 7
8. MOVING OR SPEAKING SO SLOWLY THAT OTHER PEOPLE COULD HAVE NOTICED. OR THE OPPOSITE, BEING SO FIGETY OR RESTLESS THAT YOU HAVE BEEN MOVING AROUND A LOT MORE THAN USUAL: 0
SUM OF ALL RESPONSES TO PHQ QUESTIONS 1-9: 7
9. THOUGHTS THAT YOU WOULD BE BETTER OFF DEAD, OR OF HURTING YOURSELF: 0
SUM OF ALL RESPONSES TO PHQ9 QUESTIONS 1 & 2: 4
2. FEELING DOWN, DEPRESSED OR HOPELESS: 1
4. FEELING TIRED OR HAVING LITTLE ENERGY: 0
6. FEELING BAD ABOUT YOURSELF - OR THAT YOU ARE A FAILURE OR HAVE LET YOURSELF OR YOUR FAMILY DOWN: 0
3. TROUBLE FALLING OR STAYING ASLEEP: 3
1. LITTLE INTEREST OR PLEASURE IN DOING THINGS: 3
5. POOR APPETITE OR OVEREATING: 0
SUM OF ALL RESPONSES TO PHQ QUESTIONS 1-9: 7

## 2021-10-27 NOTE — PROGRESS NOTES
[address] [facility]  [address] [facility],  [place] [address]  Phone: [phone]      Name:  Lorenzo Pompa  :    1980    Consultants:   No care team member to display    Chief Complaint:     Lorenzo Pompa is a 47 y.o. female who presents today for a New Patient care visit with Personalized Prevention Plan Services as noted below. HPI:     Patient states she was diagnosed with COVID-19 on 2021 at work. 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 She was asymptomatic at the time and was cleared to work. She then starting feeling SOB with wheezing for about a week before going to the ED on 10/19/2021. She was discharged with Albuterol and Azithromycin. She has not taken any Azithromycin yet. Since her ED visit, she has been using her Albuterol inhaler whenever she has coughing fits. Her coughing fits have been sporadic, even at rest. She has woken up several times throughout the week due to coughing. Otherwise, her wheezing has improved. Her SOB has improved, only has SOB with exertion now. She has been afebrile this entire time. She is set to return to work tomorrow. 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  Authorizing Provider   benzonatate (TESSALON) 200 MG capsule Take 1 capsule by mouth 3 times daily as needed for Cough Yes Rhona Krueger,    buprenorphine (SUBUTEX) 8 MG SUBL SL tablet Place 8 mg under the tongue daily. Yes Historical Provider, MD   guaiFENesin (MUCINEX) 600 MG extended release tablet Take 1 tablet by mouth 2 times daily for 10 days Yes Juana Brothers MD   zinc sulfate (ZINCATE) 220 (50 Zn) MG capsule Take 1 capsule by mouth daily for 7 days  Juana Brothers MD   ascorbic acid (VITAMIN C) 500 MG tablet Take 1 tablet by mouth 2 times daily for 7 days  Juana Brothers MD   naproxen (NAPROSYN) 500 MG tablet Take 1 tablet by mouth 2 times daily (with meals) for 10 days  Betty Gautam PA-C       Allergies:    Penicillins    Family History:       Problem Relation Age of Onset    Alcohol Abuse Mother        Social History:  Social History     Tobacco History     Smoking Status  Never Smoker    Smokeless Tobacco Use  Never Used          Alcohol History     Alcohol Use Status  No          Drug Use     Drug Use Status  No          Sexual Activity     Sexually Active  Yes Partners  Male                   Health Maintenance Completed:  Health Maintenance   Topic Date Due    Hepatitis C screen  Never done    Varicella vaccine (1 of 2 - 2-dose childhood series) Never done    COVID-19 Vaccine (1) Never done    HIV screen  Never done    Cervical cancer screen  Never done    Flu vaccine (1) Never done    DTaP/Tdap/Td vaccine (2 - Td or Tdap) 07/19/2027    Hepatitis A vaccine  Aged Out    Hepatitis B vaccine  Aged Out    Hib vaccine  Aged Out    Meningococcal (ACWY) vaccine  Aged Out    Pneumococcal 0-64 years Vaccine  Aged [employer] History   Administered Date(s) Administered    Tdap (Boostrix, Adacel) 07/19/2017         Review of Systems:  Review of Systems   Constitutional: Negative for chills and fever. HENT: Positive for sinus pressure. Negative for ear discharge, ear pain, sneezing, sore throat and trouble swallowing. Respiratory: Positive for cough and shortness of breath (with exertion). Cardiovascular: Negative for chest pain and palpitations. Gastrointestinal: Negative for diarrhea, nausea and vomiting. Genitourinary: Negative for dysuria. Musculoskeletal: Negative for neck pain and neck stiffness. Neurological: Negative for weakness, numbness and headaches. Physical Exam:   There were no vitals filed for this visit. There is no height or weight on file to calculate BMI. Wt Readings from Last 3 Encounters:   10/19/21 195 lb (88.5 kg)   06/30/21 195 lb (88.5 kg)   07/30/19 190 lb (86.2 kg)       BP Readings from Last 3 Encounters:   10/19/21 115/71   06/30/21 115/78   07/30/19 115/75       Physical exam minimal due to visit being on video. Physical Exam  Constitutional:       General: She is not in acute distress. Appearance: Normal appearance. She is not ill-appearing. Pulmonary:      Effort: Pulmonary effort is normal.   Musculoskeletal:      Cervical back: Normal range of motion. Neurological:      General: No focal deficit present. Mental Status: She is alert and oriented to person, place, and time.    Psychiatric:         Mood and Affect: Mood normal.         Behavior: Behavior normal.         Judgment: Judgment normal.          Lab Review:   Admission on 10/19/2021, Discharged on 10/19/2021   Component Date Value    WBC 10/19/2021 6.6     RBC 10/19/2021 5.09     Hemoglobin 10/19/2021 13.9     Hematocrit 10/19/2021 42.1     MCV 10/19/2021 82.7     MCH 10/19/2021 27.2     MCHC 10/19/2021 32.9     RDW 10/19/2021 12.9     Platelets 46/66/2747 479*    MPV 10/19/2021 7.2     Neutrophils % 10/19/2021 49.7     Lymphocytes % 10/19/2021 37.8     Monocytes % 10/19/2021 6.3     Eosinophils % 10/19/2021 5.3     Basophils % 10/19/2021 0.9     Neutrophils Absolute 10/19/2021 3.3     Lymphocytes Absolute 10/19/2021 2.5     Monocytes Absolute 10/19/2021 0.4     Eosinophils Absolute 10/19/2021 0.3     Basophils Absolute 10/19/2021 0.1     Ventricular Rate 10/19/2021 61     Atrial Rate 10/19/2021 61     P-R Interval 10/19/2021 208     QRS Duration 10/19/2021 102     Q-T Interval 10/19/2021 442     QTc Calculation (Bazett) 10/19/2021 444     P Axis 10/19/2021 80     R Axis 10/19/2021 63     T Axis 10/19/2021 51     Diagnosis 10/19/2021 Normal sinus rhythmRSR' or QR pattern in V1 suggests right ventricular conduction delayConfirmed by Darryl Fox MD, Tarik Sanchez ([id number]) on 10/19/2021 5:31:12 PM     Sodium 10/19/2021 137     Potassium reflex Magnesi* 10/19/2021 5.3*    Chloride 10/19/2021 103     CO2 10/19/2021 24     Anion Gap 10/19/2021 10     Glucose 10/19/2021 80     BUN 10/19/2021 11     CREATININE 10/19/2021 <0.5*    GFR Non- 10/19/2021 >60     GFR  10/19/2021 >60     Calcium 10/19/2021 9.0     Total Protein 10/19/2021 8.0     Albumin 10/19/2021 4.0     Albumin/Globulin Ratio 10/19/2021 1.0*    Total Bilirubin 10/19/2021 0.4     Alkaline Phosphatase 10/19/2021 64     ALT 10/19/2021 8*    AST 10/19/2021 24     Globulin 10/19/2021 4.0     Troponin 10/19/2021 <0.01     hCG Qual 10/19/2021 Negative     Potassium 10/19/2021 4.1     Procalcitonin 10/19/2021 0.03           Assessment/Plan:  Rafael Noel was seen today for ed follow-up. Diagnoses and all orders for this visit:    COVID-19 virus infection  - Start Tessalon perles due to persistent cough that wakes her up at night. - According to CDC recommendation, patient is cleared to return to work. - RTC in 3-4 weeks to establish care  -     benzonatate (TESSALON) 200 MG capsule;  Take 1 capsule by mouth 3 times daily as needed for Cough        Health Maintenance Due:  Health Maintenance Due   Topic Date Due    Hepatitis C screen  Never done    Varicella vaccine (1 of 2 - 2-dose childhood series) Never done    COVID-19 Vaccine (1) Never done    HIV screen  Never done    Cervical cancer screen  Never done    Flu vaccine (1) Never done        Health care decision maker:  <72years old      Health Maintenance: (USPSTF Recommendations)  (F) Breast Cancer Screen: (40-49 (C), 50-74 biennial screening mammogram (B)):   (F) Cervical Cancer Screen: (21-29 q3yr cytology alone; 30-65 q3yr cytology alone, q5yr with hrHPV alone, or q5yr cytology+hrHPV (A)):  CRC/Colonoscopy Screening: (adults 45-49 (B), 50-75 (A))  Lung Ca Screening: Annual LDCT (+smoker age 49-80, smoked within 15 years, total of 20 pack yr history (B)): HIV Screen: (16-65 yr old, and all pregnant patients (A)): Hep C Screen: (18-79 yr old (B)):  [address] Screen: (all pts with cirrhosis and high risk Hep B (US q6 mo)):  Immunizations:       RTC:  Return in about 3 weeks (around 11/17/2021) for Establish Care.  -f/u in 2-4 weeks to establish care        Giselaoralia Sanders was evaluated through a synchronous (real-time) audio-video encounter. The patient (or guardian if applicable) is aware that this is a billable service. Verbal consent to proceed has been obtained within the past 12 months. The visit was conducted pursuant to the emergency declaration under the [address] [place] [address], [address] [place] [address] authority and the Walk-in Appointment Scheduler and [employer] General Act. Patient identification was verified, and a caregiver was present when appropriate. The patient was located in a state where the provider was credentialed to provide care. Total time spent for this encounter: Not billed by time    --Iam Huber DO on 10/27/2021 at 2:15 PM    An electronic signature was used to authenticate this note. EMR Dragon/transcription disclaimer:  Much of this encounter note is electronic transcription/translation of spoken language to printed texts. The electronic translation of spoken language may be erroneous, or at times, nonsensical words or phrases may be inadvertently transcribed.   Although I have reviewed the note for such errors, some may still exist.     Iam Huber DO  10/27/2021

## 2021-10-27 NOTE — PATIENT INSTRUCTIONS
CDC recommendations to return to work:  - For most children and adults with symptomatic SARS-CoV-2, the virus that causes COVID-19, infection, isolation, and precautions can be discontinued 10 days after symptom onset and after resolution of fever for at least 24 hours and improvement of other symptoms.  - For people who are severely ill (i.e., those requiring hospitalization, intensive care, or ventilation support) or severely immunocompromised, extending the duration of isolation and precautions up to 20 days after symptom onset and after resolution of fever and improvement of other symptoms may be warranted. - For people who are infected but asymptomatic (never develop symptoms), isolation and precautions can be discontinued 10 days after the first positive test.  - Patients who have recovered from COVID-19 can continue to have detectable SARS-CoV-2 RNA in upper respiratory specimens for up to 3 months after illness onset. However, replication-competent virus has not been reliably recovered and infectiousness is unlikely. Patient Education        10 Things to Do When You Have COVID-19    Stay home. Don't go to school, work, or public areas. And don't use public transportation, ride-shares, or taxis unless you have no choice. Leave your home only if you need to get medical care. But call the doctor's office first so they know you're coming. And wear a mask. Ask before leaving isolation. Follow your doctor's advice about when it is safe for you to leave isolation. Wear a mask when you are around other people. It can help stop the spread of the virus. Limit contact with people in your home. If possible, stay in a separate bedroom and use a separate bathroom. Avoid contact with pets and other animals. If possible, have a friend or family member care for them while you're sick. Cover your mouth and nose with a tissue when you cough or sneeze.   Then throw the tissue in the trash right away.     Mitchell Shaina your hands often, especially after you cough or sneeze. Use soap and water, and scrub for at least 20 seconds. If soap and water aren't available, use an alcohol-based hand . Don't share personal household items. These include bedding, towels, cups and glasses, and eating utensils. Clean and disinfect your home every day. Use household  or disinfectant wipes or sprays. If needed, take acetaminophen (Tylenol) or ibuprofen (Advil, Motrin) to relieve fever and body aches. Read and follow all instructions on the label. Current as of: March 26, 2021               Content Version: 13.0  © 2006-2021 Healthwise, Incorporated. Care instructions adapted under license by [facility] ([facility]). If you have questions about a medical condition or this instruction, always ask your healthcare professional. [place] [address] any warranty or liability for your use of this information.

## 2021-12-21 ENCOUNTER — APPOINTMENT (OUTPATIENT)
Dept: GENERAL RADIOLOGY | Age: 41
End: 2021-12-21
Payer: COMMERCIAL

## 2021-12-21 ENCOUNTER — HOSPITAL ENCOUNTER (EMERGENCY)
Age: 41
Discharge: HOME | End: 2021-12-21
Payer: COMMERCIAL

## 2021-12-21 VITALS
BODY MASS INDEX: 30.53 KG/M2 | WEIGHT: 190 LBS | SYSTOLIC BLOOD PRESSURE: 120 MMHG | RESPIRATION RATE: 15 BRPM | TEMPERATURE: 98.3 F | OXYGEN SATURATION: 98 % | HEART RATE: 83 BPM | HEIGHT: 66 IN | DIASTOLIC BLOOD PRESSURE: 74 MMHG

## 2021-12-21 DIAGNOSIS — J18.9 PNEUMONIA DUE TO INFECTIOUS ORGANISM, UNSPECIFIED LATERALITY, UNSPECIFIED PART OF LUNG: Primary | ICD-10-CM

## 2021-12-21 LAB
INFLUENZA A: NOT DETECTED
INFLUENZA B: NOT DETECTED
S PYO AG THROAT QL: NEGATIVE
SARS-COV-2 RNA, RT PCR: NOT DETECTED

## 2021-12-21 PROCEDURE — 87880 STREP A ASSAY W/OPTIC: CPT

## 2021-12-21 PROCEDURE — 71045 X-RAY EXAM CHEST 1 VIEW: CPT

## 2021-12-21 PROCEDURE — 87081 CULTURE SCREEN ONLY: CPT

## 2021-12-21 PROCEDURE — 99284 EMERGENCY DEPT VISIT MOD MDM: CPT

## 2021-12-21 PROCEDURE — 87636 SARSCOV2 & INF A&B AMP PRB: CPT

## 2021-12-21 RX ORDER — CEFDINIR 300 MG/1
300 CAPSULE ORAL 2 TIMES DAILY
Qty: 20 CAPSULE | Refills: 0 | Status: SHIPPED | OUTPATIENT
Start: 2021-12-21 | End: 2021-12-31

## 2021-12-21 RX ORDER — AZITHROMYCIN 250 MG/1
250 TABLET, FILM COATED ORAL SEE ADMIN INSTRUCTIONS
Qty: 6 TABLET | Refills: 0 | Status: SHIPPED | OUTPATIENT
Start: 2021-12-21 | End: 2021-12-26

## 2021-12-21 ASSESSMENT — ENCOUNTER SYMPTOMS
SHORTNESS OF BREATH: 0
ABDOMINAL PAIN: 0
COUGH: 1
EYE PAIN: 0
RHINORRHEA: 1
BLOOD IN STOOL: 0
SORE THROAT: 1
DIARRHEA: 0
VOMITING: 0
BACK PAIN: 0
NAUSEA: 0

## 2021-12-21 ASSESSMENT — PAIN DESCRIPTION - DESCRIPTORS: DESCRIPTORS: ACHING

## 2021-12-21 ASSESSMENT — PAIN DESCRIPTION - PAIN TYPE: TYPE: ACUTE PAIN

## 2021-12-21 ASSESSMENT — PAIN SCALES - GENERAL: PAINLEVEL_OUTOF10: 7

## 2021-12-21 ASSESSMENT — PAIN DESCRIPTION - LOCATION: LOCATION: THROAT

## 2021-12-21 NOTE — ED PROVIDER NOTES
[address] ED  EMERGENCY DEPARTMENT ENCOUNTER        Pt Name: Jacques Duenas  MRN: [id number]  [place] 1980  Date of evaluation: 12/21/2021  Provider: BERTO Moreno CNP  PCP: No primary care provider on file. Note Started: 9:55 AM EST      GINA. I have evaluated this patient. My supervising physician was available for consultation. Triage CHIEF COMPLAINT       Chief Complaint   Patient presents with    Pharyngitis     onset 2-3 days. pt states her voice comes and goes at times.  Cough     NPC          HISTORY OF PRESENT ILLNESS   (Location/Symptom, Timing/Onset, Context/Setting, Quality, Duration, Modifying Factors, Severity)  Note limiting factors. Chief Complaint: Sore throat    Jacques Duenas is a 47 y.o. female who presents to the emergency department with symptoms of cough that is nonproductive and sore throat. Patient reported symptoms began approximately 3 days ago. States that she is able to swallow without difficulty. Denies any poor p.o. intake. Denies any fever. Patient reports otherwise feeling well. Patient displays no other acute complaints at this time. Reported symptoms have been ongoing now for 3 days without any relief. Denies any known positive sick contacts. Denies any known positive COVID-19 infection. patient states that she is a non-smoker. Nursing Notes were all reviewed and agreed with or any disagreements were addressed in the HPI. REVIEW OF SYSTEMS    (2-9 systems for level 4, 10 or more for level 5)     Review of Systems   Constitutional: Negative for chills, diaphoresis and fever. HENT: Positive for congestion, rhinorrhea and sore throat. Negative for ear pain. Eyes: Negative for pain and visual disturbance. Respiratory: Positive for cough. Negative for shortness of breath. Cardiovascular: Negative for chest pain and leg swelling.    Gastrointestinal: Negative for abdominal pain, blood in stool, diarrhea, nausea and vomiting. Genitourinary: Negative for difficulty urinating, dysuria, flank pain and frequency. Musculoskeletal: Negative for back pain and neck pain. Skin: Negative for rash and wound. Neurological: Negative for dizziness and light-headedness. 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     NAPROXEN (NAPROSYN) 500 MG TABLET    Take 1 tablet by mouth 2 times daily (with meals) for 10 days    ZINC SULFATE (ZINCATE) 220 (50 ZN) MG CAPSULE    Take 1 capsule by mouth daily for 7 days       ALLERGIES     Penicillins    FAMILYHISTORY       Family History   Problem Relation Age of Onset    Alcohol Abuse Mother         SOCIAL HISTORY       Social History     Socioeconomic History    Marital status: Single     Spouse name: None    Number of children: None    Years of education: None    Highest education level: None   Occupational History    None   Tobacco Use    Smoking status: Never Smoker    Smokeless tobacco: Never Used   Vaping Use    Vaping Use: Never used   Substance and Sexual Activity    Alcohol use: No    Drug use: No    Sexual activity: Yes     Partners: Male   Other Topics Concern    None   Social History Narrative    None     Social Determinants of Health     Financial Resource Strain:     Difficulty of Paying Living Expenses: Not on file   Food Insecurity:     Worried About Running Out of Food in the Last Year: Not on file    Ran Out of Food in the Last Year: Not on file   Transportation Needs:     Lack of Transportation (Medical): Not on file    Lack of Transportation (Non-Medical): Not on file   Physical Activity:     Days of Exercise per Week: Not on file    Minutes of Exercise per Session: Not on file   Stress:     Feeling of Stress : Not on file   Social Connections:     Frequency of Communication with Friends and Family: Not on file    Frequency of Social Gatherings with Friends and Family: Not on file    Attends [employer] Services: Not on file    Active Member of [address] [place] [address] [email or web address] or Organizations: Not on file    Attends Club or Organization Meetings: Not on file    Marital Status: Not on file   Intimate Partner Violence:     Fear of Current or Ex-Partner: Not on file    Emotionally Abused: Not on file    Physically Abused: Not on file    Sexually Abused: Not on file   Housing Stability:     Unable to Pay for Housing in the Last Year: Not on file    Number of [place] in the Last Year: Not on file    Unstable Housing in the Last Year: Not on file       SCREENINGS             PHYSICAL EXAM    (up to 7 for level 4, 8 or more for level 5)     ED Triage Vitals   BP Temp Temp Source Pulse Resp SpO2 Height Weight   12/21/21 0940 12/21/21 0937 12/21/21 0937 12/21/21 0937 12/21/21 0937 12/21/21 0937 12/21/21 0937 12/21/21 0937   124/80 98.3 °F (36.8 °C) Tympanic 90 18 98 % 5' 6\" (1.676 m) 190 lb (86.2 kg)       Physical Exam  Vitals and nursing note reviewed. Constitutional:       Appearance: Normal appearance. She is not toxic-appearing or diaphoretic. HENT:      Head: Normocephalic and atraumatic. Right Ear: Tympanic membrane normal. No tenderness. Left Ear: Tympanic membrane normal. No tenderness. Nose: Nose normal. No congestion or rhinorrhea.       Mouth/Throat:      Mouth: Mucous membranes are moist.      Pharynx: Posterior oropharyngeal erythema present. No oropharyngeal exudate or uvula swelling. Tonsils: No tonsillar exudate or tonsillar abscesses. 2+ on the right. 2+ on the left. Eyes:      General:         Right eye: No discharge. Left eye: No discharge. Cardiovascular:      Rate and Rhythm: Normal rate and regular rhythm. Heart sounds: Normal heart sounds. No murmur heard. Pulmonary:      Effort: Pulmonary effort is normal. No respiratory distress. Breath sounds: Normal breath sounds. No wheezing or rhonchi. Abdominal:      Palpations: Abdomen is soft. Tenderness: There is no abdominal tenderness. There is no guarding. Musculoskeletal:         General: Normal range of motion. Cervical back: Normal range of motion and neck supple. Skin:     General: Skin is warm and dry. Capillary Refill: Capillary refill takes less than 2 seconds. Findings: No rash. Neurological:      General: No focal deficit present. Mental Status: She is alert and oriented to person, place, and time. Psychiatric:         Mood and Affect: Mood normal.         Behavior: Behavior normal.         DIAGNOSTIC RESULTS   LABS:    Labs Reviewed   COVID-19 & INFLUENZA COMBO    Narrative:     Performed at:  [facility],  [place], [facility]   Phone [phone]   STREP SCREEN GROUP A THROAT    Narrative:     Performed at:  [facility]) - [facility],  [place], [facility]   Phone [phone]   CULTURE, BETA STREP CONFIRM PLATES       When ordered, only abnormal lab results are displayed. All other labs were within normal range or not returned as of this dictation. EKG: When ordered, EKG's are interpreted by the Emergency Department Physician in the absence of a cardiologist.  Please see their note for interpretation of EKG.       RADIOLOGY:   Non-plain film images such as CT, Ultrasound and MRI are read by the radiologist. Aleksandar Driscoll radiographic images are visualized andpreliminarily interpreted by the  ED Provider with the below findings:        Interpretation perthe Radiologist below, if available at the time of this note:    XR CHEST PORTABLE   Final Result   Subtle curvilinear opacities within the mid right lung and bilateral lung   bases, concerning for early developing multifocal pneumonia, to include   atypical viral causes. No results found. PROCEDURES   Unless otherwise noted below, none     Procedures    CRITICAL CARE TIME   N/A    CONSULTS:  None      EMERGENCY DEPARTMENT COURSE and DIFFERENTIAL DIAGNOSIS/MDM:   Vitals:    Vitals:    12/21/21 0937 12/21/21 0940   BP:  124/80   Pulse: 90    Resp: 18    Temp: 98.3 °F (36.8 °C)    TempSrc: Tympanic    SpO2: 98%    Weight: 190 lb (86.2 kg)    Height: 5' 6\" (1.676 m)        Patient was given thefollowing medications:  Medications - No data to display        Patient has findings of pneumonia on x-ray. At this time should be treated for community-acquired pneumonia. The patient appears well nontoxic. This time the patient's vital signs are stable. She is afebrile. The patient is noted to be negative for Covid and flu. Patient strep test was also negative. FINAL IMPRESSION      1. Pneumonia due to infectious organism, unspecified laterality, unspecified part of lung          DISPOSITION/PLAN   DISPOSITION Decision To Discharge 12/21/2021 10:51:04 AM      PATIENT REFERREDTO:  Primary Care Provider  Follow-up with your primary care provider in the next 2 to 3 days.   Schedule an appointment as soon as possible for a visit         DISCHARGE MEDICATIONS:  New Prescriptions    AZITHROMYCIN (ZITHROMAX) 250 MG TABLET    Take 1 tablet by mouth See Admin Instructions for 5 days 500mg on day 1 followed by 250mg on days 2 - 5    CEFDINIR (OMNICEF) 300 MG CAPSULE    Take 1 capsule by mouth 2 times daily for 10 days       DISCONTINUED MEDICATIONS:  Discontinued Medications    No medications on file              (Please note that portions ofthis note were completed with a voice recognition program.  Efforts were made to edit the dictations but occasionally words are mis-transcribed.)    BERTO Bocanegra CNP (electronically signed)            BERTO Bocanegra CNP  12/21/21 1058

## 2021-12-21 NOTE — Clinical Note
Gisela Sanders was seen and treated in our emergency department on 12/21/2021. She may return to work on 12/23/2021. If you have any questions or concerns, please don't hesitate to call.       Prtiesh Cavazos, APRN - CNP

## 2021-12-24 LAB — S PYO THROAT QL CULT: NORMAL

## 2022-01-26 ENCOUNTER — HOSPITAL ENCOUNTER (EMERGENCY)
Age: 42
Discharge: HOME | End: 2022-01-26
Payer: COMMERCIAL

## 2022-01-26 ENCOUNTER — APPOINTMENT (OUTPATIENT)
Dept: GENERAL RADIOLOGY | Age: 42
End: 2022-01-26
Payer: COMMERCIAL

## 2022-01-26 VITALS
HEIGHT: 66 IN | TEMPERATURE: 97.2 F | BODY MASS INDEX: 30.53 KG/M2 | HEART RATE: 100 BPM | SYSTOLIC BLOOD PRESSURE: 130 MMHG | DIASTOLIC BLOOD PRESSURE: 81 MMHG | OXYGEN SATURATION: 97 % | WEIGHT: 190 LBS | RESPIRATION RATE: 16 BRPM

## 2022-01-26 DIAGNOSIS — J20.9 ACUTE BRONCHITIS WITH BRONCHOSPASM: Primary | ICD-10-CM

## 2022-01-26 LAB
EKG ATRIAL RATE: 107 BPM
EKG DIAGNOSIS: NORMAL
EKG P AXIS: 71 DEGREES
EKG P-R INTERVAL: 186 MS
EKG Q-T INTERVAL: 330 MS
EKG QRS DURATION: 94 MS
EKG QTC CALCULATION (BAZETT): 440 MS
EKG R AXIS: 65 DEGREES
EKG T AXIS: 51 DEGREES
EKG VENTRICULAR RATE: 107 BPM
INFLUENZA A: NOT DETECTED
INFLUENZA B: NOT DETECTED
SARS-COV-2 RNA, RT PCR: NOT DETECTED

## 2022-01-26 PROCEDURE — 71046 X-RAY EXAM CHEST 2 VIEWS: CPT

## 2022-01-26 PROCEDURE — 99284 EMERGENCY DEPT VISIT MOD MDM: CPT

## 2022-01-26 PROCEDURE — 6370000000 HC RX 637 (ALT 250 FOR IP): Performed by: PHYSICIAN ASSISTANT

## 2022-01-26 PROCEDURE — 87636 SARSCOV2 & INF A&B AMP PRB: CPT

## 2022-01-26 PROCEDURE — 93010 ELECTROCARDIOGRAM REPORT: CPT | Performed by: INTERNAL MEDICINE

## 2022-01-26 PROCEDURE — 93005 ELECTROCARDIOGRAM TRACING: CPT | Performed by: EMERGENCY MEDICINE

## 2022-01-26 RX ORDER — BENZONATATE 100 MG/1
100 CAPSULE ORAL 3 TIMES DAILY PRN
Qty: 20 CAPSULE | Refills: 0 | Status: SHIPPED | OUTPATIENT
Start: 2022-01-26 | End: 2022-02-02

## 2022-01-26 RX ORDER — ALBUTEROL SULFATE 90 UG/1
2 AEROSOL, METERED RESPIRATORY (INHALATION) EVERY 6 HOURS PRN
Qty: 18 G | Refills: 0 | Status: SHIPPED | OUTPATIENT
Start: 2022-01-26

## 2022-01-26 RX ORDER — ALBUTEROL SULFATE 90 UG/1
2 AEROSOL, METERED RESPIRATORY (INHALATION) EVERY 6 HOURS PRN
Qty: 18 G | Refills: 0 | Status: SHIPPED | OUTPATIENT
Start: 2022-01-26 | End: 2022-01-26 | Stop reason: SDUPTHER

## 2022-01-26 RX ORDER — PREDNISONE 20 MG/1
40 TABLET ORAL DAILY
Qty: 8 TABLET | Refills: 0 | Status: SHIPPED | OUTPATIENT
Start: 2022-01-26 | End: 2022-01-30

## 2022-01-26 RX ORDER — IPRATROPIUM BROMIDE AND ALBUTEROL SULFATE 2.5; .5 MG/3ML; MG/3ML
1 SOLUTION RESPIRATORY (INHALATION) ONCE
Status: COMPLETED | OUTPATIENT
Start: 2022-01-26 | End: 2022-01-26

## 2022-01-26 RX ORDER — BENZONATATE 100 MG/1
100 CAPSULE ORAL 3 TIMES DAILY PRN
Qty: 20 CAPSULE | Refills: 0 | Status: SHIPPED | OUTPATIENT
Start: 2022-01-26 | End: 2022-01-26 | Stop reason: SDUPTHER

## 2022-01-26 RX ORDER — PREDNISONE 20 MG/1
40 TABLET ORAL DAILY
Qty: 8 TABLET | Refills: 0 | Status: SHIPPED | OUTPATIENT
Start: 2022-01-26 | End: 2022-01-26 | Stop reason: SDUPTHER

## 2022-01-26 RX ORDER — PREDNISONE 20 MG/1
60 TABLET ORAL ONCE
Status: COMPLETED | OUTPATIENT
Start: 2022-01-26 | End: 2022-01-26

## 2022-01-26 RX ADMIN — PREDNISONE 60 MG: 20 TABLET ORAL at 15:07

## 2022-01-26 RX ADMIN — IPRATROPIUM BROMIDE AND ALBUTEROL SULFATE 1 AMPULE: .5; 2.5 SOLUTION RESPIRATORY (INHALATION) at 16:32

## 2022-01-26 RX ADMIN — IPRATROPIUM BROMIDE AND ALBUTEROL SULFATE 1 AMPULE: .5; 2.5 SOLUTION RESPIRATORY (INHALATION) at 15:07

## 2022-01-26 ASSESSMENT — PAIN DESCRIPTION - LOCATION: LOCATION: BACK;CHEST

## 2022-01-26 ASSESSMENT — ENCOUNTER SYMPTOMS
CHEST TIGHTNESS: 1
WHEEZING: 1
SHORTNESS OF BREATH: 1
COUGH: 1
VOMITING: 0
ABDOMINAL PAIN: 0

## 2022-01-26 ASSESSMENT — PAIN SCALES - GENERAL: PAINLEVEL_OUTOF10: 5

## 2022-01-26 NOTE — ED PROVIDER NOTES
[address] ED  EMERGENCY DEPARTMENT ENCOUNTER        Pt Name: Shamika Perez  MRN: [id number]  [place] 1980  Date of evaluation: 1/26/2022  Provider: Dima Bishop PA-C  PCP: No primary care provider on file. Shared Visit or Autonomous Visit: GINA. I have evaluated this patient. My supervising physician was available for consultation. CHIEF COMPLAINT       Chief Complaint   Patient presents with    Shortness of Breath     Patient states that she feels very wheezy and SOB, states that her chest feels tight with breathing. HISTORY OF PRESENT ILLNESS   (Location/Symptom, Timing/Onset, Context/Setting, Quality, Duration, Modifying Factors, Severity)  Note limiting factors. Shamika Perez is a 46 y.o. female presenting to the emergency department for evaluation of dry cough, wheezing, shortness of breath, tightness in her chest symptoms for the past 2 days. 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 Had pneumonia about a month ago. Non-smoker. No fever. Denies chest pain states its tightness in lungs worse with coughing. The history is provided by the patient. Cough  Cough characteristics:  Dry and hacking  Duration:  2 days  Chronicity:  New  Smoker: no    Associated symptoms: shortness of breath and wheezing    Associated symptoms: no chest pain and no fever    Risk factors: no recent travel        Nursing Notes were reviewed    REVIEW OF SYSTEMS    (2-9 systems for level 4, 10 or more for level 5)     Review of Systems   Constitutional: Negative for fever. Respiratory: Positive for cough, chest tightness, shortness of breath and wheezing. Cardiovascular: Negative for chest pain and leg swelling. Gastrointestinal: Negative for abdominal pain and vomiting. All other systems reviewed and are negative. Positives and Pertinent negatives as per HPI.        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 NAPROXEN (NAPROSYN) 500 MG TABLET    Take 1 tablet by mouth 2 times daily (with meals) for 10 days    ZINC SULFATE (ZINCATE) 220 (50 ZN) MG CAPSULE    Take 1 capsule by mouth daily for 7 days         ALLERGIES     Penicillins    FAMILYHISTORY       Family History   Problem Relation Age of Onset    Alcohol Abuse Mother           SOCIAL HISTORY       Social History     Socioeconomic History    Marital status: Single     Spouse name: None    Number of children: None    Years of education: None    Highest education level: None   Occupational History    None   Tobacco Use    Smoking status: Never Smoker    Smokeless tobacco: Never Used   Vaping Use    Vaping Use: Never used   Substance and Sexual Activity    Alcohol use: No    Drug use: No    Sexual activity: Yes     Partners: Male   Other Topics Concern    None   Social History Narrative    None     Social Determinants of Health     Financial Resource Strain:     Difficulty of Paying Living Expenses: Not on file   Food Insecurity:     Worried About Running Out of Food in the Last Year: Not on file    Hanny of Food in the Last Year: Not on file   Transportation Needs:     Lack of Transportation (Medical): Not on file    Lack of Transportation (Non-Medical):  Not on file   Physical Activity:     Days of Exercise per Week: Not on file    Minutes of Exercise Breath sounds: No stridor. Decreased breath sounds and wheezing present. No rhonchi or rales. Abdominal:      General: Bowel sounds are normal. There is no distension. Palpations: Abdomen is soft. Abdomen is not rigid. There is no mass. Tenderness: There is no abdominal tenderness. There is no guarding or rebound. Musculoskeletal:         General: Normal range of motion. Cervical back: Normal range of motion and neck supple. Right lower leg: No edema. Left lower leg: No edema. Skin:     General: Skin is warm and dry. Findings: No rash. Neurological:      Mental Status: She is alert and oriented to person, place, and time. GCS: GCS eye subscore is 4. GCS verbal subscore is 5. GCS motor subscore is 6. Cranial Nerves: No cranial nerve deficit. Sensory: No sensory deficit. Motor: No abnormal muscle tone. Coordination: Coordination normal.   Psychiatric:         Behavior: Behavior normal.         DIAGNOSTIC RESULTS   LABS:    Labs Reviewed   COVID-19 & INFLUENZA COMBO    Narrative:     Performed at:  [facility] [address],  [place], [facility]   Phone [phone]     Results for orders placed or performed during the hospital encounter of 01/26/22   COVID-19 & Influenza Combo    Specimen: Nasopharyngeal Swab   Result Value Ref Range    SARS-CoV-2 RNA, RT PCR NOT DETECTED NOT DETECTED    INFLUENZA A NOT DETECTED NOT DETECTED    INFLUENZA B NOT DETECTED NOT DETECTED   EKG 12 Lead   Result Value Ref Range    Ventricular Rate 107 BPM    Atrial Rate 107 BPM    P-R Interval 186 ms    QRS Duration 94 ms    Q-T Interval 330 ms    QTc Calculation (Bazett) 440 ms    P Axis 71 degrees    R Axis 65 degrees    T Axis 51 degrees    Diagnosis       Sinus tachycardiaConfirmed by BOO Jaime MD ([id number]) on 1/26/2022 4:14:47 PM     EKG showing sinus rhythm. Heart rate 107. No acute ischemia.     All other labs were within normal range or not returned as of this dictation. EKG: All EKG's are interpreted by the Emergency Department Physician in the absence of a cardiologist.  Please see their note for interpretation of EKG. RADIOLOGY:   Non-plain film images such as CT, Ultrasound and MRI are read by the radiologist. Plain radiographic images are visualized andpreliminarily interpreted by the  ED Provider with the below findings:        Interpretation perthe Radiologist below, if available at the time of this note:    XR CHEST (2 VW)   Final Result   No acute cardiopulmonary disease is appreciated. XR CHEST (2 VW)    Result Date: 1/26/2022  EXAMINATION: TWO XRAY VIEWS OF THE CHEST 1/26/2022 2:34 pm COMPARISON: 12/21/2021 HISTORY: ORDERING SYSTEM PROVIDED HISTORY: cough, sob, wheezing, hx recent pneumonia TECHNOLOGIST PROVIDED HISTORY: Reason for exam:->cough, sob, wheezing, hx recent pneumonia Reason for Exam: cough, sob, wheezing FINDINGS: Sternotomy wires are present. Cardiac silhouette, mediastinal hilar contours are normal.  There is no focal airspace disease. There is chronic bronchial wall thickening, nonspecific. No pleural effusion or pneumothorax is identified. There is no acute osseous abnormality. No acute cardiopulmonary disease is appreciated.          PROCEDURES   Unless otherwise noted below, none     Procedures    CRITICAL CARE TIME   N/A    CONSULTS:  None      EMERGENCY DEPARTMENT COURSE and DIFFERENTIAL DIAGNOSIS/MDM:   Vitals:    Vitals:    01/26/22 1413 01/26/22 1655   BP: (!) 131/97 130/81   Pulse: 108 100   Resp: 20 16   Temp: 97.2 °F (36.2 °C)    TempSrc: Oral    SpO2: 98% 97%   Weight: 190 lb (86.2 kg)    Height: 5' 6\" (1.676 m)        Patient was given thefollowing medications:  Medications   ipratropium-albuterol (DUONEB) nebulizer solution 1 ampule (1 ampule Inhalation Given 1/26/22 1507)   predniSONE (DELTASONE) tablet 60 mg (60 mg Oral Given 1/26/22 1507)   ipratropium-albuterol (DUONEB) nebulizer solution 1 ampule (1 ampule Inhalation Given 1/26/22 1449)       ED course  Patient presented to the ER for evaluation of cough and wheezing. On exam wheezing throughout was given 2 breathing treatments with significant improvement. 4:51 PM EST repeat evaluation lung sounds are much improved she has good air exchange. Still has a few wheezes remaining but overall improved. Normal respirations. Oxygen saturation 97% on room air. Covid and flu swab negative. Chest x-ray no acute cardiopulmonary disease. Chronic bronchial wall thickening. No pneumonia. Impression bronchitis with bronchospasm. Patient will be discharged home prescriptions for prednisone, albuterol inhaler, Tessalon as needed for cough. Advised close follow-up with her doctor. Advise returning to the ER for any worsening symptoms. She understands and agrees. I estimate there is LOW risk for PULMONARY EMBOLISM, PULMONARY EDEMA, PNEUMONIA, PNEUMOTHORAX, STATUS ASTHMATICUS, ACUTE RESPIRATORY FAILURE, OR ACUTE CORONARY SYNDROME, thus I consider the discharge disposition reasonable. FINAL IMPRESSION      1. Acute bronchitis with bronchospasm          DISPOSITION/PLAN   DISPOSITION     PATIENT REFERREDTO:   [facility]  [address] [facility] [address]  [phone]    Call in 1 day  Call to arrange follow-up appointment from ER visit    [place] [facility]  [address] [facility]  [phone]    If symptoms worsen      DISCHARGE MEDICATIONS:  New Prescriptions    ALBUTEROL SULFATE HFA (PROVENTIL HFA) 108 (90 BASE) MCG/ACT INHALER    Inhale 2 puffs into the lungs every 6 hours as needed for Wheezing or Shortness of Breath    BENZONATATE (TESSALON PERLES) 100 MG CAPSULE    Take 1 capsule by mouth 3 times daily as needed for Cough    PREDNISONE (DELTASONE) 20 MG TABLET    Take 2 tablets by mouth daily for 4 days       DISCONTINUED MEDICATIONS:  Discontinued Medications    No medications on file              (Please note that portions ofthis note were completed with a voice recognition program.  Efforts were made to edit the dictations but occasionally words are mis-transcribed.)    Sowmya Cosby PA-C (electronically signed)           Tiffanie Mcdermott PA-C  01/26/22 5992

## 2022-01-26 NOTE — Clinical Note
Shamika Perez was seen and treated in our emergency department on 1/26/2022. She may return to work on 01/28/2022. If you have any questions or concerns, please don't hesitate to call.       Regina Rosa PA-C

## 2022-01-26 NOTE — Clinical Note
Stefan Galvan was seen and treated in our emergency department on 1/26/2022. She may return to work on 01/28/2022. If you have any questions or concerns, please don't hesitate to call.       Franc Berry PA-C

## 2022-04-19 ENCOUNTER — HOSPITAL ENCOUNTER (EMERGENCY)
Age: 42
Discharge: HOME | End: 2022-04-19
Attending: EMERGENCY MEDICINE
Payer: COMMERCIAL

## 2022-04-19 VITALS
DIASTOLIC BLOOD PRESSURE: 68 MMHG | WEIGHT: 190 LBS | RESPIRATION RATE: 16 BRPM | SYSTOLIC BLOOD PRESSURE: 110 MMHG | HEART RATE: 86 BPM | OXYGEN SATURATION: 96 % | HEIGHT: 66 IN | BODY MASS INDEX: 30.53 KG/M2 | TEMPERATURE: 98 F

## 2022-04-19 DIAGNOSIS — K04.7 DENTAL INFECTION: Primary | ICD-10-CM

## 2022-04-19 PROCEDURE — 99283 EMERGENCY DEPT VISIT LOW MDM: CPT

## 2022-04-19 RX ORDER — CLINDAMYCIN HYDROCHLORIDE 300 MG/1
300 CAPSULE ORAL 4 TIMES DAILY
Qty: 40 CAPSULE | Refills: 0 | Status: SHIPPED | OUTPATIENT
Start: 2022-04-19 | End: 2022-04-29

## 2022-04-19 ASSESSMENT — PAIN DESCRIPTION - DESCRIPTORS: DESCRIPTORS: ACHING

## 2022-04-19 ASSESSMENT — PAIN SCALES - GENERAL: PAINLEVEL_OUTOF10: 6

## 2022-04-19 ASSESSMENT — PAIN DESCRIPTION - PAIN TYPE: TYPE: ACUTE PAIN

## 2022-04-19 ASSESSMENT — PAIN - FUNCTIONAL ASSESSMENT: PAIN_FUNCTIONAL_ASSESSMENT: 0-10

## 2022-04-19 NOTE — ED NOTES
Patient discharged in stable, ambulatory condition with all documented belongings. This nurse reviewed discharge instruction, pt verbalized understanding.        Palomo Glover RN  04/19/22 3159

## 2022-04-19 NOTE — ED PROVIDER NOTES
Emergency Department Provider Note  Location: [place] [address] ED  4/19/2022     Patient Identification  Carlitos Encinas is a 46 y.o. female    Chief Complaint  Oral Swelling (Pt c/o lower right tooth/ gum pain and jaw swelling. )          HPI  (History provided by patient)  Patient is a 68-year-old female who presents with right-sided cheek swelling and tooth pain. Patient reports that she has had a dental infection over the past few months and is yet to see a dentist.  She has had progressive pain over the past week. Over the past 48 hours she noted some swelling just adjacent to the infected right mandibular molar on the cheek area. It does not involve the submandibular area. No difficulty swallowing or speaking. No fevers chills nausea vomiting no difficulty ranging the neck. Pain constant achy no exacerbating relieving factors. Mild. 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 Past surgical history:  has a past surgical history that includes Cardiac surgery; Tubal ligation; Cardiac surgery; and Elbow surgery. Home medications:   Prior to Admission medications    Medication Sig Start Date End Date Taking? Authorizing Provider   clindamycin (CLEOCIN) 300 MG capsule Take 1 capsule by mouth 4 times daily for 10 days 4/19/22 4/29/22 Yes Devin Garcia MD   albuterol sulfate HFA (PROVENTIL HFA) 108 (90 Base) MCG/ACT inhaler Inhale 2 puffs into the lungs every 6 hours as needed for Wheezing or Shortness of Breath 1/26/22   Manisha Katz PA-C   buprenorphine (SUBUTEX) 8 MG SUBL SL tablet Place 8 mg under the tongue daily.     Historical Provider, MD   zinc sulfate (ZINCATE) 220 (50 Zn) MG capsule Take 1 capsule by mouth daily for 7 days 10/19/21 10/26/21  Enrrique Hernández MD   ascorbic acid (VITAMIN C) 500 MG tablet Take 1 tablet by mouth 2 times daily for 7 days 10/19/21 10/26/21  Enrrique Hernández MD   naproxen (NAPROSYN) 500 MG tablet Take 1 tablet by mouth 2 times daily (with meals) for 10 days 6/30/21 7/10/21  Maribeth Andersen PA-C       Social history:  reports that she has never smoked. She has never used smokeless tobacco. She reports that she does not drink alcohol and does not use drugs. Family history:    Family History   Problem Relation Age of Onset    Alcohol Abuse Mother          ROS  Review of Systems   Constitutional: Negative for chills and fever. HENT: Positive for dental problem and facial swelling. Negative for congestion, rhinorrhea and trouble swallowing. Eyes: Negative for photophobia and visual disturbance. Respiratory: Negative for cough, shortness of breath and wheezing. Cardiovascular: Negative for chest pain and palpitations. Gastrointestinal: Negative for abdominal distention, diarrhea, nausea and vomiting. Genitourinary: Negative for dysuria and hematuria. Musculoskeletal: Negative for back pain and neck pain. Skin: Negative for rash and wound. Neurological: Negative for syncope and weakness. Psychiatric/Behavioral: Negative for agitation and confusion. Exam  ED Triage Vitals [04/19/22 1335]   BP Temp Temp Source Pulse Resp SpO2 Height Weight   117/74 98 °F (36.7 °C) Temporal 88 18 100 % 5' 6\" (1.676 m) 190 lb (86.2 kg)       Physical Exam  Vitals and nursing note reviewed. Constitutional:       General: She is not in acute distress. Appearance: She is well-developed. HENT:      Head: Normocephalic and atraumatic. Right Ear: Tympanic membrane and ear canal normal.      Left Ear: Tympanic membrane and ear canal normal.      Nose: Nose normal. No congestion. Mouth/Throat:      Mouth: Mucous membranes are moist.      Pharynx: Oropharynx is clear.       Comments: Uvula midline. There is no oral pharyngeal swelling noted. No evidence of Morris's. Mild edematous changes noted on the right lower cheek. There is no superficial periapical abscess noted. Tenderness tapping the molars with tongue depressor on the right mandibular side and noted multiple dental caries. Cardiovascular:      Rate and Rhythm: Normal rate and regular rhythm. Heart sounds: No murmur heard. Pulmonary:      Effort: Pulmonary effort is normal.      Breath sounds: Normal breath sounds. Musculoskeletal:         General: No deformity. Normal range of motion. Cervical back: Normal range of motion and neck supple. Skin:     General: Skin is warm. Findings: No rash. Neurological:      Mental Status: She is alert and oriented to person, place, and time. Motor: No abnormal muscle tone. Coordination: Coordination normal.   Psychiatric:         Mood and Affect: Mood normal.         Behavior: Behavior normal.           ED Course    ED Medication Orders (From admission, onward)    None            Radiology  No results found. Labs  No results found for this visit on 04/19/22. [facility]  Patient seen and evaluated. Relevant records reviewed. 37-year-old female presents with tooth pain and swelling of the cheek in the setting of a dental infection. Well-appearing on exam reassuring vitals. No concern for osteomyelitis or deep space infection to warrant lab work or imaging at this point. Patient declining pregnancy test.  I am covering her with antibiotics for dental infection and referred for urgent dental follow-up. Return precautions discussed. Patient agreeable to plan expressed understanding of plan. Clinical Impression:  1. Dental infection          Disposition:  Discharge to home in good condition. Blood pressure 110/68, pulse 86, temperature 98 °F (36.7 °C), temperature source Temporal, resp.  rate 16, height 5' 6\" (1.676 m), weight 190 lb (86.2 kg), last menstrual period 03/16/2022, SpO2 96 %, not currently breastfeeding. Patient was given scripts for the following medications. I counseled patient how to take these medications. Discharge Medication List as of 4/19/2022  3:35 PM      START taking these medications    Details   clindamycin (CLEOCIN) 300 MG capsule Take 1 capsule by mouth 4 times daily for 10 days, Disp-40 capsule, R-0Normal             Disposition referral (if applicable):  Dentist    Schedule an appointment as soon as possible for a visit           Total critical care time is 0 minutes, which excludes separately billable procedures and updating family. Time spent is specifically for management of the presenting complaint and symptoms initially, direct bedside care, reevaluation, review of records, and consultation. There was a high probability of clinically significant life-threatening deterioration in the patient's condition, which required my urgent intervention. This chart was generated in part by using Dragon Dictation system and may contain errors related to that system including errors in grammar, punctuation, and spelling, as well as words and phrases that may be inappropriate. If there are any questions or concerns please feel free to contact the dictating provider for clarification.      Ava Donovan MD  [address] MD  04/21/22 0169

## 2022-04-21 ASSESSMENT — ENCOUNTER SYMPTOMS
ABDOMINAL DISTENTION: 0
RHINORRHEA: 0
TROUBLE SWALLOWING: 0
WHEEZING: 0
PHOTOPHOBIA: 0
VOMITING: 0
SHORTNESS OF BREATH: 0
FACIAL SWELLING: 1
NAUSEA: 0
BACK PAIN: 0
DIARRHEA: 0
COUGH: 0

## 2022-07-27 ENCOUNTER — APPOINTMENT (OUTPATIENT)
Dept: GENERAL RADIOLOGY | Age: 42
End: 2022-07-27
Payer: COMMERCIAL

## 2022-07-27 ENCOUNTER — HOSPITAL ENCOUNTER (EMERGENCY)
Age: 42
Discharge: HOME | End: 2022-07-27
Payer: COMMERCIAL

## 2022-07-27 VITALS
SYSTOLIC BLOOD PRESSURE: 137 MMHG | OXYGEN SATURATION: 100 % | TEMPERATURE: 98.7 F | HEART RATE: 85 BPM | BODY MASS INDEX: 34.55 KG/M2 | DIASTOLIC BLOOD PRESSURE: 74 MMHG | WEIGHT: 215 LBS | HEIGHT: 66 IN | RESPIRATION RATE: 14 BRPM

## 2022-07-27 DIAGNOSIS — S82.892A CLOSED AVULSION FRACTURE OF LEFT ANKLE, INITIAL ENCOUNTER: Primary | ICD-10-CM

## 2022-07-27 PROCEDURE — 99283 EMERGENCY DEPT VISIT LOW MDM: CPT

## 2022-07-27 PROCEDURE — 73610 X-RAY EXAM OF ANKLE: CPT

## 2022-07-27 RX ORDER — DICLOFENAC SODIUM 75 MG/1
75 TABLET, DELAYED RELEASE ORAL 2 TIMES DAILY
Qty: 14 TABLET | Refills: 0 | Status: SHIPPED | OUTPATIENT
Start: 2022-07-27 | End: 2022-08-03

## 2022-07-27 ASSESSMENT — PAIN DESCRIPTION - LOCATION: LOCATION: ANKLE

## 2022-07-27 ASSESSMENT — PAIN - FUNCTIONAL ASSESSMENT: PAIN_FUNCTIONAL_ASSESSMENT: 0-10

## 2022-07-27 ASSESSMENT — PAIN SCALES - GENERAL: PAINLEVEL_OUTOF10: 5

## 2022-07-27 ASSESSMENT — PAIN DESCRIPTION - ORIENTATION: ORIENTATION: LEFT

## 2022-07-27 ASSESSMENT — PAIN DESCRIPTION - DESCRIPTORS: DESCRIPTORS: SORE

## 2022-07-27 NOTE — LETTER
[facility]  ED  [address] [address]  Phone: [phone]  Fax: [phone]               July 27, 2022    Patient: Raven Cornelius   YOB: 1980   Date of Visit: 7/27/2022       To Whom It May Concern:    Davian Bunch was seen and treated in our emergency department on 7/27/2022. She may return to work on 07/28/2022.       Sincerely,               Signature:__________________________________

## 2022-07-29 NOTE — ED PROVIDER NOTES
[address] [place] [address]  ED  EMERGENCY DEPARTMENT ENCOUNTER      This patient was not seen and evaluated by the attending physician. Pt Name: Aleja Machado  MRN: [id number]  [place] 1980  Date of evaluation: 7/27/2022  Provider: BERTO Carter  PCP: No primary care provider on file. History provided by the patient. CHIEFCOMPLAINT:     Chief Complaint   Patient presents with    Ankle Pain     Pt c/o left ankle pain. Started hurting after picking up nephew last night. HISTORY OF PRESENT ILLNESS:      Aleja Machado is a 46 y.o. female who presents to [address] [place] [address]  ED with complaints of left ankle pain. Patient states that she rolled her ankle last night, complaining of persistent discomfort. Patient states the pain is worse when she ambulates. She has some swelling noted along the lateral aspect of the ankle. She is here for further evaluation      LOCATION:left ankle  QUALITY:ache  SEVERITY:5  DURATION:today  MODIFYING FACTORS:none noted    Nursing Notes were reviewed     REVIEW OF SYSTEMS:     Review of Systems  All systems, a total of 10, are reviewed and negative except for those that were just noted in history present illness.         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     Pain management contract agreement     Viral meningitis 2010         SURGICAL HISTORY:      Past Surgical History:   Procedure Laterality Date    CARDIAC SURGERY      CARDIAC SURGERY      repaired hole in heart at age 5 year    ELBOW SURGERY      broken, plates in elbow    TUBAL LIGATION           CURRENT MEDICATIONS:       Discharge Medication List as of 7/27/2022  2:42 PM        CONTINUE these medications which have NOT CHANGED    Details   albuterol sulfate HFA (PROVENTIL HFA) 108 (90 Base) MCG/ACT inhaler Inhale 2 puffs into the lungs every 6 hours as needed for Wheezing or Shortness of Breath, Disp-18 g, R-0Print      buprenorphine (SUBUTEX) 8 MG SUBL SL tablet Place 8 mg under the tongue daily. Historical Med      zinc sulfate (ZINCATE) 220 (50 Zn) MG capsule Take 1 capsule by mouth daily for 7 days, Disp-7 capsule, R-0Normal      ascorbic acid (VITAMIN C) 500 MG tablet Take 1 tablet by mouth 2 times daily for 7 days, Disp-14 tablet, R-0Normal      naproxen (NAPROSYN) 500 MG tablet Take 1 tablet by mouth 2 times daily (with meals) for 10 days, Disp-20 tablet, R-0Print               ALLERGIES:    Penicillins    FAMILY HISTORY:       Family History   Problem Relation Age of Onset    Alcohol Abuse Mother           SOCIAL HISTORY:     Social History     Socioeconomic History    Marital status: Single   Tobacco Use    Smoking status: Never    Smokeless tobacco: Never   Vaping Use    Vaping Use: Some days    Substances: CBD   Substance and Sexual Activity    Alcohol use: No    Drug use: No    Sexual activity: Yes     Partners: Male       SCREENINGS:             PHYSICAL EXAM:       ED Triage Vitals [07/27/22 1354]   BP Temp Temp Source Heart Rate Resp SpO2 Height Weight   137/74 98.7 °F (37.1 °C) Oral 85 14 100 % 5' 6\" (1.676 m) 215 lb (97.5 kg)       Physical Exam    CONSTITUTIONAL: Awake and alert. Cooperative. Well-developed. Well-nourished. Vitals:    07/27/22 1354   BP: 137/74   Pulse: 85   Resp: 14   Temp: 98.7 °F (37.1 °C)   TempSrc: Oral   SpO2: 100%   Weight: 215 lb (97.5 kg)   Height: 5' 6\" (1.676 m)     HENT: Normocephalic. Atraumatic. External ears normal, without discharge. Nonasal discharge. Mucous membranes moist.  EYES: Conjunctiva non-injected, no lid abnormalities noted. No scleral icterus. EOM's grossly intact. Anterior chambers clear. NECK: Supple. Normal ROM. No meningismus. CARDIOVASCULAR: no tachycardia per vital signs. PULMONARY/CHEST WALL: Effort normal. No tachypnea. No audible adventitious breath sounds.   ABDOMEN: No obvious abdominal distention, no obvious hernias. Back: Spine is midline. No obvious trauma or outward signs of cauda equina  /ANORECTAL: Not assessed  MUSKULOSKELETAL: Limited range of motion of left ankle secondary to pain and swelling, there is a moderate amount of swelling noted along the lateral aspect of the left ankle. 2+ dorsalis pedis and posterior tibial pulses present  SKIN: Warm and dry. NEUROLOGICAL:  GCS 15. No obvious focal neurological deficits. PSYCHIATRIC: Normal affect, normal insight and judgement. Alert and oriented x 3. DIAGNOSTIC RESULTS:     LABS:    No results found for this visit on 07/27/22. RADIOLOGY:  All x-ray studies are viewed/reviewed by me. Formal interpretations per the radiologist are as follows:      XR ANKLE LEFT (MIN 3 VIEWS)   Final Result   Possible avulsion fracture adjacent to the tip of the medial malleolus. Diffuse soft tissue swelling. EKG:  See EKG interpretation by an attending physician. PROCEDURES:   N/A    CRITICAL CARE TIME:   N/A    CONSULTS:  None      EMERGENCY DEPARTMENT COURSE andDIFFERENTIAL DIAGNOSIS/MDM:   Vitals:    Vitals:    07/27/22 1354   BP: 137/74   Pulse: 85   Resp: 14   Temp: 98.7 °F (37.1 °C)   TempSrc: Oral   SpO2: 100%   Weight: 215 lb (97.5 kg)   Height: 5' 6\" (1.676 m)       Patient wasgiven the following medications:  Medications - No data to display      Patient was evaluated independently by myself with the attending physician available for consultation. Patient presented to the emergency department with complaints of left ankle pain. Radiographic imaging showed a possible avulsion fracture to the lateral malleolus. Patient physical exam consistent with this, placed in an ankle brace, given crutches, instructed to rest ice and elevate and follow-up with orthopedics. Patient discharged home in good condition.     Patient laboratory studies, radiographic imaging, and assessment were all discussed with the patient and/orpatient

## 2022-12-02 ENCOUNTER — HOSPITAL ENCOUNTER (EMERGENCY)
Age: 42
Discharge: LEFT BEFORE DISCHARGE | End: 2022-12-02

## 2022-12-02 VITALS
OXYGEN SATURATION: 99 % | BODY MASS INDEX: 30.53 KG/M2 | SYSTOLIC BLOOD PRESSURE: 148 MMHG | WEIGHT: 190 LBS | TEMPERATURE: 97.4 F | RESPIRATION RATE: 18 BRPM | DIASTOLIC BLOOD PRESSURE: 75 MMHG | HEIGHT: 66 IN | HEART RATE: 98 BPM

## 2022-12-02 ASSESSMENT — PAIN - FUNCTIONAL ASSESSMENT: PAIN_FUNCTIONAL_ASSESSMENT: 0-10

## 2022-12-02 ASSESSMENT — PAIN DESCRIPTION - FREQUENCY: FREQUENCY: CONTINUOUS

## 2022-12-02 ASSESSMENT — PAIN DESCRIPTION - LOCATION: LOCATION: MOUTH

## 2022-12-02 ASSESSMENT — PAIN SCALES - GENERAL: PAINLEVEL_OUTOF10: 4

## 2022-12-02 ASSESSMENT — PAIN DESCRIPTION - PAIN TYPE: TYPE: ACUTE PAIN

## 2022-12-02 NOTE — ED NOTES
Patient hit call light and asked how much longer the provider would be. When writer went to r1 answer her, patient was not in room. Did not locate patient in restroom. Provider made aware.       Cecy Berumen RN  12/02/22 4161

## 2022-12-26 ENCOUNTER — APPOINTMENT (OUTPATIENT)
Dept: CT IMAGING | Age: 42
End: 2022-12-26
Payer: COMMERCIAL

## 2022-12-26 ENCOUNTER — APPOINTMENT (OUTPATIENT)
Dept: GENERAL RADIOLOGY | Age: 42
End: 2022-12-26
Payer: COMMERCIAL

## 2022-12-26 ENCOUNTER — HOSPITAL ENCOUNTER (EMERGENCY)
Age: 42
Discharge: HOME | End: 2022-12-26
Attending: EMERGENCY MEDICINE
Payer: COMMERCIAL

## 2022-12-26 VITALS
BODY MASS INDEX: 30.53 KG/M2 | RESPIRATION RATE: 13 BRPM | SYSTOLIC BLOOD PRESSURE: 98 MMHG | OXYGEN SATURATION: 94 % | TEMPERATURE: 98.3 F | HEIGHT: 66 IN | DIASTOLIC BLOOD PRESSURE: 60 MMHG | HEART RATE: 67 BPM | WEIGHT: 190 LBS

## 2022-12-26 DIAGNOSIS — J02.9 ACUTE PHARYNGITIS, UNSPECIFIED ETIOLOGY: Primary | ICD-10-CM

## 2022-12-26 DIAGNOSIS — L53.9 SKIN ERYTHEMA: ICD-10-CM

## 2022-12-26 DIAGNOSIS — R79.89 ELEVATED D-DIMER: ICD-10-CM

## 2022-12-26 DIAGNOSIS — M79.604 ACUTE PAIN OF RIGHT LOWER EXTREMITY: ICD-10-CM

## 2022-12-26 LAB
ANION GAP SERPL CALCULATED.3IONS-SCNC: 7 MMOL/L (ref 3–16)
BASOPHILS ABSOLUTE: 0.1 K/UL (ref 0–0.2)
BASOPHILS RELATIVE PERCENT: 1.1 %
BUN BLDV-MCNC: 13 MG/DL (ref 7–20)
CALCIUM SERPL-MCNC: 9.7 MG/DL (ref 8.3–10.6)
CHLORIDE BLD-SCNC: 103 MMOL/L (ref 99–110)
CO2: 27 MMOL/L (ref 21–32)
CREAT SERPL-MCNC: 0.6 MG/DL (ref 0.6–1.1)
D DIMER: 1.21 UG/ML FEU (ref 0–0.6)
EOSINOPHILS ABSOLUTE: 0.3 K/UL (ref 0–0.6)
EOSINOPHILS RELATIVE PERCENT: 4.3 %
GFR SERPL CREATININE-BSD FRML MDRD: >60 ML/MIN/{1.73_M2}
GLUCOSE BLD-MCNC: 84 MG/DL (ref 70–99)
HCT VFR BLD CALC: 35.8 % (ref 36–48)
HEMOGLOBIN: 12 G/DL (ref 12–16)
INFLUENZA A: NOT DETECTED
INFLUENZA B: NOT DETECTED
LYMPHOCYTES ABSOLUTE: 1.9 K/UL (ref 1–5.1)
LYMPHOCYTES RELATIVE PERCENT: 24.9 %
MCH RBC QN AUTO: 27.6 PG (ref 26–34)
MCHC RBC AUTO-ENTMCNC: 33.6 G/DL (ref 31–36)
MCV RBC AUTO: 82.1 FL (ref 80–100)
MONOCYTES ABSOLUTE: 1 K/UL (ref 0–1.3)
MONOCYTES RELATIVE PERCENT: 13.5 %
NEUTROPHILS ABSOLUTE: 4.2 K/UL (ref 1.7–7.7)
NEUTROPHILS RELATIVE PERCENT: 56.2 %
PDW BLD-RTO: 12.8 % (ref 12.4–15.4)
PLATELET # BLD: 606 K/UL (ref 135–450)
PMV BLD AUTO: 6.9 FL (ref 5–10.5)
POTASSIUM REFLEX MAGNESIUM: 3.8 MMOL/L (ref 3.5–5.1)
RBC # BLD: 4.36 M/UL (ref 4–5.2)
S PYO AG THROAT QL: NEGATIVE
SARS-COV-2 RNA, RT PCR: NOT DETECTED
SODIUM BLD-SCNC: 137 MMOL/L (ref 136–145)
WBC # BLD: 7.6 K/UL (ref 4–11)

## 2022-12-26 PROCEDURE — 6370000000 HC RX 637 (ALT 250 FOR IP): Performed by: STUDENT IN AN ORGANIZED HEALTH CARE EDUCATION/TRAINING PROGRAM

## 2022-12-26 PROCEDURE — 6360000004 HC RX CONTRAST MEDICATION: Performed by: EMERGENCY MEDICINE

## 2022-12-26 PROCEDURE — 87880 STREP A ASSAY W/OPTIC: CPT

## 2022-12-26 PROCEDURE — 71260 CT THORAX DX C+: CPT | Performed by: EMERGENCY MEDICINE

## 2022-12-26 PROCEDURE — 87636 SARSCOV2 & INF A&B AMP PRB: CPT

## 2022-12-26 PROCEDURE — 80048 BASIC METABOLIC PNL TOTAL CA: CPT

## 2022-12-26 PROCEDURE — 99285 EMERGENCY DEPT VISIT HI MDM: CPT

## 2022-12-26 PROCEDURE — 96374 THER/PROPH/DIAG INJ IV PUSH: CPT

## 2022-12-26 PROCEDURE — 85025 COMPLETE CBC W/AUTO DIFF WBC: CPT

## 2022-12-26 PROCEDURE — 71045 X-RAY EXAM CHEST 1 VIEW: CPT

## 2022-12-26 PROCEDURE — 87081 CULTURE SCREEN ONLY: CPT

## 2022-12-26 PROCEDURE — 36415 COLL VENOUS BLD VENIPUNCTURE: CPT

## 2022-12-26 PROCEDURE — 85379 FIBRIN DEGRADATION QUANT: CPT

## 2022-12-26 PROCEDURE — 6360000002 HC RX W HCPCS: Performed by: EMERGENCY MEDICINE

## 2022-12-26 PROCEDURE — 2580000003 HC RX 258: Performed by: EMERGENCY MEDICINE

## 2022-12-26 RX ORDER — KETOROLAC TROMETHAMINE 30 MG/ML
30 INJECTION, SOLUTION INTRAMUSCULAR; INTRAVENOUS ONCE
Status: COMPLETED | OUTPATIENT
Start: 2022-12-26 | End: 2022-12-26

## 2022-12-26 RX ORDER — DEXAMETHASONE SODIUM PHOSPHATE 10 MG/ML
10 INJECTION, SOLUTION INTRAMUSCULAR; INTRAVENOUS ONCE
Status: COMPLETED | OUTPATIENT
Start: 2022-12-26 | End: 2022-12-26

## 2022-12-26 RX ORDER — DULOXETIN HYDROCHLORIDE 20 MG/1
20 CAPSULE, DELAYED RELEASE ORAL DAILY
COMMUNITY

## 2022-12-26 RX ORDER — ERGOCALCIFEROL 1.25 MG/1
50000 CAPSULE ORAL WEEKLY
COMMUNITY

## 2022-12-26 RX ORDER — CEPHALEXIN 500 MG/1
500 CAPSULE ORAL ONCE
Status: COMPLETED | OUTPATIENT
Start: 2022-12-26 | End: 2022-12-26

## 2022-12-26 RX ORDER — CEPHALEXIN 500 MG/1
500 CAPSULE ORAL 4 TIMES DAILY
Qty: 28 CAPSULE | Refills: 0 | Status: SHIPPED | OUTPATIENT
Start: 2022-12-26 | End: 2023-01-02

## 2022-12-26 RX ORDER — 0.9 % SODIUM CHLORIDE 0.9 %
1000 INTRAVENOUS SOLUTION INTRAVENOUS ONCE
Status: COMPLETED | OUTPATIENT
Start: 2022-12-26 | End: 2022-12-26

## 2022-12-26 RX ORDER — DULAGLUTIDE 1.5 MG/.5ML
1.5 INJECTION, SOLUTION SUBCUTANEOUS WEEKLY
COMMUNITY

## 2022-12-26 RX ADMIN — KETOROLAC TROMETHAMINE 30 MG: 30 INJECTION, SOLUTION INTRAMUSCULAR; INTRAVENOUS at 06:21

## 2022-12-26 RX ADMIN — APIXABAN 80 MG: 5 TABLET, FILM COATED ORAL at 10:16

## 2022-12-26 RX ADMIN — APIXABAN 10 MG: 5 TABLET, FILM COATED ORAL at 10:16

## 2022-12-26 RX ADMIN — DEXAMETHASONE SODIUM PHOSPHATE 10 MG: 10 INJECTION, SOLUTION INTRAMUSCULAR; INTRAVENOUS at 06:22

## 2022-12-26 RX ADMIN — SODIUM CHLORIDE 1000 ML: 9 INJECTION, SOLUTION INTRAVENOUS at 07:14

## 2022-12-26 RX ADMIN — IOPAMIDOL 85 ML: 755 INJECTION, SOLUTION INTRAVENOUS at 07:35

## 2022-12-26 RX ADMIN — CEPHALEXIN 500 MG: 500 CAPSULE ORAL at 10:16

## 2022-12-26 ASSESSMENT — PAIN DESCRIPTION - LOCATION: LOCATION: LEG

## 2022-12-26 ASSESSMENT — ENCOUNTER SYMPTOMS
BACK PAIN: 0
VOMITING: 0
EYE DISCHARGE: 0
SHORTNESS OF BREATH: 0
RHINORRHEA: 0
COUGH: 0
CHEST TIGHTNESS: 0
EYE REDNESS: 0
ABDOMINAL PAIN: 0
DIARRHEA: 0

## 2022-12-26 ASSESSMENT — PAIN SCALES - GENERAL
PAINLEVEL_OUTOF10: 3
PAINLEVEL_OUTOF10: 4

## 2022-12-26 ASSESSMENT — PAIN DESCRIPTION - PAIN TYPE: TYPE: ACUTE PAIN

## 2022-12-26 ASSESSMENT — PAIN DESCRIPTION - FREQUENCY: FREQUENCY: CONTINUOUS

## 2022-12-26 ASSESSMENT — PAIN DESCRIPTION - DESCRIPTORS: DESCRIPTORS: TIGHTNESS

## 2022-12-26 ASSESSMENT — PAIN - FUNCTIONAL ASSESSMENT: PAIN_FUNCTIONAL_ASSESSMENT: 0-10

## 2022-12-26 ASSESSMENT — PAIN DESCRIPTION - ORIENTATION: ORIENTATION: RIGHT;LOWER

## 2022-12-26 NOTE — ED TRIAGE NOTES
Pt states that she has had a sore throat for the last week and she has had leg pain and tightness for the last 2 days. States that she has had cellulitis in the past but this feels a little different. VSS at this time.

## 2022-12-26 NOTE — DISCHARGE INSTRUCTIONS
Please follow-up for an outpatient ultrasound of your right lower extremity to rule out a blood clot. You have been started on blood thinners in the meantime. If you do not have a blood clot, discontinue these. If you do have a blood clot, follow-up with your primary care doctor for further blood thinner prescriptions. You have also been started on antibiotics for possible skin infection. Please return to the emergency department if you have any new or worsening symptoms.

## 2022-12-26 NOTE — Clinical Note
Gopi Chino was seen and treated in our emergency department on 12/26/2022. She may return to work on 12/27/2022. If you have any questions or concerns, please don't hesitate to call.       Kathleen Gipson MD

## 2022-12-26 NOTE — ED PROVIDER NOTES
Emergency Department Attending Physician Note  Location: [facility] 298 ED  12/26/2022       Pt Name: Roscoe Terrell  MRN: [id number]  [place] 1980    Date of evaluation: 12/26/2022  Provider: Alia Allan DO  PCP: Dakota Guzmán DO    Note Started: 5:43 AM EST 12/26/22    CHIEF COMPLAINT:  No chief complaint on file. HISTORY OF PRESENT ILLNESS:  History obtained by patient. Limitations to history : None. Roscoe Terrell is a 46 y.o. female with a significant PMHx of chronic neck pain, chronic back pain, and other comorbidities as listed below, presenting the emergency department today with concerns of 2 separate problems seemingly; 1 is an area of redness and heat to her right lower extremity, behind her ankle, with pain that goes all the way up her calf to behind her knee. Patient feels it is swollen, but this morning says it does not look that bad compared to last night. Additionally, she said she has had a cough for 3 to 4 days and now is having a sore throat. Patient says she has tonsillitis in the past, and says this feels exactly the same. Denies any difficulty tolerating secretions, but says it hurts to swallow. Denies any neck pain, headaches, vision changes. Facial tenderness. Is somewhat congested, but \"not too bad. \"  Recent falls or injuries to the right lower extremity. No recent periods prolonged immobilization. No history of DVT or PE. Not on any hormones including birth control. Patient arrives emergency department today with a heart rate of  on cardiac monitoring during my exam.    Nursing Notes were all reviewed and agreed with or any disagreements were addressed in the HPI.     MEDICAL HISTORY  Past Medical History:   Diagnosis Date    Bulging discs     Deterioration of spinal disc of lower back     Pain management contract agreement     Viral meningitis 2010        SURGICAL HISTORY  Past Surgical History:   Procedure Laterality Date CARDIAC SURGERY      CARDIAC SURGERY      repaired hole in heart at age 5 year    ELBOW SURGERY      broken, plates in elbow    TUBAL LIGATION         CURRENT MEDICATIONS  Previous Medications    ALBUTEROL SULFATE HFA (PROVENTIL HFA) 108 (90 BASE) MCG/ACT INHALER    Inhale 2 puffs into the lungs every 6 hours as needed for Wheezing or Shortness of Breath    ASCORBIC ACID (VITAMIN C) 500 MG TABLET    Take 1 tablet by mouth 2 times daily for 7 days    BUPRENORPHINE (SUBUTEX) 8 MG SUBL SL TABLET    Place 8 mg under the tongue daily. DICLOFENAC (VOLTAREN) 75 MG EC TABLET    Take 1 tablet by mouth in the morning and 1 tablet before bedtime. Do all this for 7 days. NAPROXEN (NAPROSYN) 500 MG TABLET    Take 1 tablet by mouth 2 times daily (with meals) for 10 days    ZINC SULFATE (ZINCATE) 220 (50 ZN) MG CAPSULE    Take 1 capsule by mouth daily for 7 days       ALLERGIES  Penicillins    FAMILYHISTORY  Family History   Problem Relation Age of Onset    Alcohol Abuse Mother        SOCIAL HISTORY  Social History     Tobacco Use    Smoking status: Never    Smokeless tobacco: Never   Vaping Use    Vaping Use: Some days    Substances: CBD   Substance Use Topics    Alcohol use: No    Drug use: Yes     Types: Marijuana (Weed)       SCREENINGS                        REVIEW OF SYSTEMS:    Review of Systems   Constitutional:  Negative for activity change, appetite change, fatigue and fever. HENT:  Negative for congestion and rhinorrhea. Eyes:  Negative for discharge and redness. Respiratory:  Negative for cough, chest tightness and shortness of breath. Cardiovascular:  Negative for chest pain and leg swelling. Gastrointestinal:  Negative for abdominal pain, diarrhea and vomiting. Musculoskeletal:  Negative for back pain and neck pain. Skin:  Positive for wound. Negative for rash. Neurological:  Negative for dizziness, light-headedness and headaches. Positives and Pertinent negatives as per HPI.     PHYSICAL EXAM:     There were no vitals filed for this visit. Physical Exam  Vitals reviewed. Constitutional:       General: She is not in acute distress. Appearance: Normal appearance. She is not ill-appearing. HENT:      Head: Normocephalic and atraumatic. Right Ear: External ear normal.      Left Ear: External ear normal.      Nose: Nose normal. No congestion. Mouth/Throat:      Pharynx: Posterior oropharyngeal erythema present. No oropharyngeal exudate. Comments: Tonsillar swelling 2+, erythematous. Uvula midline. No obvious obstruction. Eyes:      General:         Right eye: No discharge. Left eye: No discharge. Conjunctiva/sclera: Conjunctivae normal.   Cardiovascular:      Rate and Rhythm: Normal rate and regular rhythm. Pulmonary:      Effort: Pulmonary effort is normal. No respiratory distress. Breath sounds: Normal breath sounds. No wheezing. Abdominal:      General: Abdomen is flat. There is no distension. Palpations: Abdomen is soft. Tenderness: There is no abdominal tenderness. Musculoskeletal:         General: No swelling or tenderness. Cervical back: Normal range of motion and neck supple. No tenderness. Right lower leg: No edema. Left lower leg: No edema. Skin:     General: Skin is warm and dry. Findings: Erythema present. Comments: There is a couple patchy areas of erythema and warmth to the right lower extremity with calf tenderness to palpation to the popliteal region. No distended veins. No significant calf swelling, although possibly some. No significant circumferential redness. Distal pulses intact. See photo below. Neurological:      General: No focal deficit present. Mental Status: She is alert and oriented to person, place, and time. Cranial Nerves: No cranial nerve deficit. Sensory: No sensory deficit.    Psychiatric:         Mood and Affect: Mood normal.         Behavior: Behavior normal. DIAGNOSTIC RESULTS:    LABS:    Labs Reviewed - No data to display    When ordered, only abnormal lab results are displayed. All other labs were within normal range or not returned as of this dictation. RADIOLOGY:    Plain radiographic images are visualized and preliminarily interpreted by the ED Provider with the below findings: Unremarkable; no obvious infiltrates, no obvious deformities, cardiac silhouette appears normal.      Non-plain film images such as CT, Ultrasound and MRI are read by the radiologist. Interpretation per the Radiologist below, if available at the time of this note:  No orders to display   CT not yet resolved at time of signout. PROCEDURES:  Unless otherwise noted below, none. Procedures    MEDICATIONS:   Medications - No data to display  _____________________________________    MEDICAL DECISION MAKING:     Patient is a 46 y.o. female with a significant PMHx of tonsillitis, presenting emergency department today with concerns of sore throat, cough, and on my exam, some tonsillar enlargement and erythema, but what is more concerning is that she is tachycardic to low 100s, having right lower extremity warmth, redness as above, though patchy in nature, and subjective swelling. No history of DVT or PE. Phonating normally, tolerating secretions, no respiratory distress. Will give Decadron for swollen tonsils, Toradol for initial pain management of both the leg and throat. PERC: 1--tachy, will get d-dimer    6:47 AM  D-dimer is elevated to 1.21  As patient is tachycardic, will obtain CT PE, and also venous duplex right lower extremity. Otherwise, CBC with elevated platelets at 401. BMP unremarkable. COVID-19, influenza negative. Strep negative. At the time of signout, chest CT pending. Ultrasound duplex pending. Disucssed with oncoming ED physician, Dr. Aviva Gomez. Please see her notes for further details.       CLINICAL IMPRESSION:   No diagnosis found.    _____________________________________       I, Rhona Dacosta DO, am the primary attending of record and contributed the majority of evaluation and treatment of emergent care for this encounter. This chart was generated in part by using Dragon Dictation system and may contain errors related to that system including errors in grammar, punctuation, and spelling, as well as words and phrases that may be inappropriate. If there are any questions or concerns please feel free to contact the dictating provider for clarification.      RHONA DACOSTA DO (electronically signed)   [address]  01/03/23 0128

## 2022-12-27 NOTE — ED PROVIDER NOTES
8:42 PM: I discussed the history, physical examination, laboratory and imaging studies, and treatment plan with Dr. La Burnette. Joleen Brambila was signed out to me in stable condition. Please see Dr. Arthur Smith documentation for details of their history, physical, and laboratory studies. Upon re-examination, a summary of Helen Stratton's history, physical examination, and studies are as follows:      Patient presented for sore throat. She got Decadron. Strep was negative. Per Dr. La Burnette no need for empiric antibiotics. Patient was also noted to have right lower extremity swelling and redness, patient reports pain goes up to her knee. D-dimer had been obtained and was elevated. At the time of signout, CT PE study was pending as well as DVT ultrasound. Patient not reporting any chest pain or shortness of breath but was tachycardic which was the reason for the CT PE study. Plan was if there was no evidence of blood clots on imaging, plan to treat for cellulitis    ED Course as of 12/26/22 2042   Mon Dec 26, 2022   2723 CT PE: IMPRESSION:  1. No definite radiographic finding to suggest presence of pulmonary  thromboembolism with reservation that portions of segmental/subsegmental  branches of the pulmonary arterial tree are suboptimally evaluated due to  motion/streak artifact as described above. 2. No evidence of focal confluent pulmonary infiltrate. [ER]   1009 Vascular ultrasound is not available today. Discussed with patient, she is comfortable with anticoagulation and follow-up on an outpatient basis. We will also treat for possible cellulitis. Right lower extremity does show evidence of some mild erythema and patient does have tenderness to palpation of the calf [ER]      ED Course User Index  [ER] Padmaja Hendrickson MD     Tachycardia resolved with fluids. Patient feels well. Patient given Eliquis home pack and started on Keflex. Strict return precautions given.   Referral for outpatient DVT ultrasound given. Encourage close PCP follow-up.   Patient discharged in stable condition     Chandrakant Segovia MD  12/26/22 204

## 2022-12-28 LAB — S PYO THROAT QL CULT: NORMAL

## 2023-05-21 ENCOUNTER — HOSPITAL ENCOUNTER (EMERGENCY)
Age: 43
Discharge: HOME | End: 2023-05-21

## 2023-05-21 VITALS
DIASTOLIC BLOOD PRESSURE: 84 MMHG | OXYGEN SATURATION: 100 % | BODY MASS INDEX: 31.34 KG/M2 | HEIGHT: 66 IN | TEMPERATURE: 96.6 F | RESPIRATION RATE: 16 BRPM | SYSTOLIC BLOOD PRESSURE: 133 MMHG | WEIGHT: 195 LBS | HEART RATE: 79 BPM

## 2023-05-21 DIAGNOSIS — K08.89 DENTALGIA: ICD-10-CM

## 2023-05-21 DIAGNOSIS — L03.115 CELLULITIS OF RIGHT LOWER EXTREMITY: Primary | ICD-10-CM

## 2023-05-21 DIAGNOSIS — K02.9 DENTAL CARIES: ICD-10-CM

## 2023-05-21 PROCEDURE — 99283 EMERGENCY DEPT VISIT LOW MDM: CPT

## 2023-05-21 PROCEDURE — 6370000000 HC RX 637 (ALT 250 FOR IP): Performed by: PHYSICIAN ASSISTANT

## 2023-05-21 RX ORDER — CHLORHEXIDINE GLUCONATE 0.12 MG/ML
15 RINSE ORAL 2 TIMES DAILY
Qty: 420 ML | Refills: 0 | Status: SHIPPED | OUTPATIENT
Start: 2023-05-21 | End: 2023-06-04

## 2023-05-21 RX ORDER — CLINDAMYCIN HYDROCHLORIDE 300 MG/1
300 CAPSULE ORAL 3 TIMES DAILY
Qty: 30 CAPSULE | Refills: 0 | Status: SHIPPED | OUTPATIENT
Start: 2023-05-21 | End: 2023-05-31

## 2023-05-21 RX ORDER — CLINDAMYCIN HYDROCHLORIDE 150 MG/1
300 CAPSULE ORAL ONCE
Status: COMPLETED | OUTPATIENT
Start: 2023-05-21 | End: 2023-05-21

## 2023-05-21 RX ADMIN — CLINDAMYCIN HYDROCHLORIDE 300 MG: 150 CAPSULE ORAL at 15:00

## 2023-05-21 ASSESSMENT — LIFESTYLE VARIABLES
HOW MANY STANDARD DRINKS CONTAINING ALCOHOL DO YOU HAVE ON A TYPICAL DAY: PATIENT DOES NOT DRINK
HOW OFTEN DO YOU HAVE A DRINK CONTAINING ALCOHOL: NEVER

## 2023-05-21 ASSESSMENT — PAIN SCALES - GENERAL: PAINLEVEL_OUTOF10: 3

## 2023-05-21 ASSESSMENT — PAIN DESCRIPTION - LOCATION: LOCATION: TEETH

## 2023-05-21 ASSESSMENT — PAIN - FUNCTIONAL ASSESSMENT: PAIN_FUNCTIONAL_ASSESSMENT: 0-10

## 2023-07-10 ENCOUNTER — HOSPITAL ENCOUNTER (EMERGENCY)
Age: 43
Discharge: HOME | End: 2023-07-10

## 2023-07-10 VITALS
OXYGEN SATURATION: 100 % | RESPIRATION RATE: 16 BRPM | DIASTOLIC BLOOD PRESSURE: 78 MMHG | SYSTOLIC BLOOD PRESSURE: 130 MMHG | BODY MASS INDEX: 31.34 KG/M2 | HEART RATE: 81 BPM | HEIGHT: 66 IN | WEIGHT: 195 LBS | TEMPERATURE: 97.3 F

## 2023-07-10 DIAGNOSIS — K08.89 PAIN, DENTAL: Primary | ICD-10-CM

## 2023-07-10 DIAGNOSIS — K02.9 DENTAL CARIES: ICD-10-CM

## 2023-07-10 PROCEDURE — 6370000000 HC RX 637 (ALT 250 FOR IP): Performed by: NURSE PRACTITIONER

## 2023-07-10 PROCEDURE — 99283 EMERGENCY DEPT VISIT LOW MDM: CPT

## 2023-07-10 RX ORDER — NICOTINE 14MG/24HR
1 PATCH, TRANSDERMAL 24 HOURS TRANSDERMAL DAILY
Qty: 30 CAPSULE | Refills: 0 | Status: SHIPPED | OUTPATIENT
Start: 2023-07-10

## 2023-07-10 RX ORDER — IBUPROFEN 600 MG/1
600 TABLET ORAL ONCE
Status: COMPLETED | OUTPATIENT
Start: 2023-07-10 | End: 2023-07-10

## 2023-07-10 RX ORDER — CLINDAMYCIN HYDROCHLORIDE 150 MG/1
300 CAPSULE ORAL ONCE
Status: COMPLETED | OUTPATIENT
Start: 2023-07-10 | End: 2023-07-10

## 2023-07-10 RX ORDER — LIDOCAINE HYDROCHLORIDE 20 MG/ML
10 SOLUTION OROPHARYNGEAL ONCE
Status: COMPLETED | OUTPATIENT
Start: 2023-07-10 | End: 2023-07-10

## 2023-07-10 RX ORDER — CLINDAMYCIN HYDROCHLORIDE 300 MG/1
300 CAPSULE ORAL 3 TIMES DAILY
Qty: 30 CAPSULE | Refills: 0 | Status: SHIPPED | OUTPATIENT
Start: 2023-07-10 | End: 2023-07-20

## 2023-07-10 RX ADMIN — CLINDAMYCIN HYDROCHLORIDE 300 MG: 150 CAPSULE ORAL at 18:05

## 2023-07-10 RX ADMIN — Medication 10 ML: at 18:05

## 2023-07-10 RX ADMIN — IBUPROFEN 600 MG: 600 TABLET, FILM COATED ORAL at 18:05

## 2023-07-10 ASSESSMENT — PAIN SCALES - GENERAL
PAINLEVEL_OUTOF10: 9
PAINLEVEL_OUTOF10: 8

## 2023-07-10 ASSESSMENT — PAIN DESCRIPTION - ORIENTATION: ORIENTATION: RIGHT

## 2023-07-10 ASSESSMENT — PAIN DESCRIPTION - DESCRIPTORS: DESCRIPTORS: ACHING

## 2023-07-10 ASSESSMENT — PAIN - FUNCTIONAL ASSESSMENT: PAIN_FUNCTIONAL_ASSESSMENT: 0-10

## 2023-07-10 NOTE — DISCHARGE INSTRUCTIONS
[facility]   [phone] or [address] [place] [address] Avenue: [address] [place] [address].  Up to age 12  48 Nora Cage [phone]

## 2023-07-13 ASSESSMENT — ENCOUNTER SYMPTOMS
ABDOMINAL PAIN: 0
VOMITING: 0
EYE PAIN: 0
NAUSEA: 0
SHORTNESS OF BREATH: 0
TROUBLE SWALLOWING: 0
VOICE CHANGE: 0
BACK PAIN: 0
RHINORRHEA: 0
COUGH: 0
SORE THROAT: 0

## 2023-07-13 NOTE — ED PROVIDER NOTES
[address] [address] ED  EMERGENCY DEPARTMENT ENCOUNTER        Pt Name: Garth Slaughter  MRN: [id number]  [address] [facility] 1980  Date of evaluation: 7/10/2023  Provider: BERTO Jesus CNP  PCP: Jane Farias DO  Note Started: 3:47 PM EDT 7/13/23      GINA. I have evaluated this patient. CHIEF COMPLAINT       Chief Complaint   Patient presents with    Dental Pain     Right sided dental pain increased pain this morning        HISTORY OF PRESENT ILLNESS: 1 or more Elements     History From: Patient    Limitations to history : None    Social Determinants Significantly Affecting Health : None    Chief Complaint: Dental pain    Garth Slaughter is a 38 y.o. female who presents to the emergency department with symptoms of dental pain. Patient reported symptoms of dental pain began last couple days. Right-sided upper molars of significant dental caries. No difficulty in opening mouth. No submental space swelling or pain. No jaw pain. No difficulty eating or drinking. States otherwise feeling well. History of poor dentition. Nursing Notes were all reviewed and agreed with or any disagreements were addressed in the HPI. REVIEW OF SYSTEMS :      Review of Systems   Constitutional:  Negative for chills, diaphoresis and fever. HENT:  Positive for dental problem. Negative for congestion, ear pain, rhinorrhea, sore throat, trouble swallowing and voice change. Eyes:  Negative for pain and visual disturbance. Respiratory:  Negative for cough and shortness of breath. Cardiovascular:  Negative for chest pain and leg swelling. Gastrointestinal:  Negative for abdominal pain, nausea and vomiting. Genitourinary:  Negative for difficulty urinating, dysuria, flank pain and frequency. Musculoskeletal:  Negative for back pain and neck pain. Skin:  Negative for rash and wound. Neurological:  Negative for dizziness and light-headedness.      Positives and Pertinent negatives as per

## 2023-10-15 ENCOUNTER — HOSPITAL ENCOUNTER (EMERGENCY)
Age: 43
Discharge: HOME | End: 2023-10-15

## 2023-10-15 VITALS
DIASTOLIC BLOOD PRESSURE: 75 MMHG | HEART RATE: 94 BPM | RESPIRATION RATE: 18 BRPM | HEIGHT: 66 IN | TEMPERATURE: 98.5 F | SYSTOLIC BLOOD PRESSURE: 115 MMHG | BODY MASS INDEX: 34.01 KG/M2 | WEIGHT: 211.6 LBS | OXYGEN SATURATION: 97 %

## 2023-10-15 DIAGNOSIS — K02.9 DENTAL DECAY: ICD-10-CM

## 2023-10-15 DIAGNOSIS — K02.9 PAIN DUE TO DENTAL CARIES: Primary | ICD-10-CM

## 2023-10-15 PROCEDURE — 6370000000 HC RX 637 (ALT 250 FOR IP): Performed by: NURSE PRACTITIONER

## 2023-10-15 PROCEDURE — 99284 EMERGENCY DEPT VISIT MOD MDM: CPT

## 2023-10-15 RX ORDER — CLINDAMYCIN HYDROCHLORIDE 150 MG/1
450 CAPSULE ORAL 3 TIMES DAILY
Qty: 90 CAPSULE | Refills: 0 | Status: SHIPPED | OUTPATIENT
Start: 2023-10-15 | End: 2023-10-25

## 2023-10-15 RX ORDER — LIDOCAINE HYDROCHLORIDE 20 MG/ML
5 SOLUTION OROPHARYNGEAL EVERY 8 HOURS PRN
Qty: 1 EACH | Refills: 0 | Status: SHIPPED | OUTPATIENT
Start: 2023-10-15 | End: 2023-10-25

## 2023-10-15 RX ORDER — LIDOCAINE HYDROCHLORIDE 20 MG/ML
5 SOLUTION OROPHARYNGEAL ONCE
Status: COMPLETED | OUTPATIENT
Start: 2023-10-15 | End: 2023-10-15

## 2023-10-15 RX ORDER — CLINDAMYCIN HYDROCHLORIDE 150 MG/1
450 CAPSULE ORAL ONCE
Status: COMPLETED | OUTPATIENT
Start: 2023-10-15 | End: 2023-10-15

## 2023-10-15 RX ADMIN — CLINDAMYCIN HYDROCHLORIDE 450 MG: 150 CAPSULE ORAL at 14:00

## 2023-10-15 RX ADMIN — Medication 5 ML: at 14:00

## 2023-10-15 ASSESSMENT — PAIN DESCRIPTION - ORIENTATION: ORIENTATION: LEFT;UPPER

## 2023-10-15 ASSESSMENT — PAIN DESCRIPTION - FREQUENCY: FREQUENCY: CONTINUOUS

## 2023-10-15 ASSESSMENT — ENCOUNTER SYMPTOMS
RHINORRHEA: 0
SORE THROAT: 0
FACIAL SWELLING: 0
SHORTNESS OF BREATH: 0
COLOR CHANGE: 0
ABDOMINAL PAIN: 0

## 2023-10-15 ASSESSMENT — PAIN DESCRIPTION - DESCRIPTORS: DESCRIPTORS: PRESSURE;STABBING

## 2023-10-15 ASSESSMENT — PAIN - FUNCTIONAL ASSESSMENT: PAIN_FUNCTIONAL_ASSESSMENT: 0-10

## 2023-10-15 ASSESSMENT — PAIN SCALES - GENERAL: PAINLEVEL_OUTOF10: 7

## 2023-10-15 ASSESSMENT — PAIN DESCRIPTION - PAIN TYPE: TYPE: ACUTE PAIN

## 2023-10-15 ASSESSMENT — PAIN DESCRIPTION - LOCATION: LOCATION: TEETH

## 2023-10-15 NOTE — DISCHARGE INSTRUCTIONS
Dental Emergency Referrals    [address] [facility] residents only)    [facility] AT [place]  [address] [facility]. [phone]   [address]  [phone]   Lahaina Salomon Anglin  [phone]   [facility]  (entrance on [address]. [facility]  [address] [facility], [address]  [phone]   [facility]   [address]  [phone]   [facility]  [address] [place] [address] SPECIALTY [facility].  [phone]       [address] [place] [address] [facility] [address]  [phone]   [facility].  [phone]     [facility]  [address] [facility]. 2nd floor  [phone]   [facility]  [address] [phone] ([address] [facility] [address])  [place]: [address] [facility] [address]: [address]   [phone] [address] [facility]/ [facility]  [phone]     [facility]  [address] [facility] [place]  [phone]   [address] [facility]  [address]  [address]  Oral Surgery Dept: [phone]  Dental Clinic: [address]  [address] [place] [address] Road,2Nd Floor  [phone]     [address]  [address] Floor [phone]   [facility]   [address], [place], [address]  [place] : [address] [facility] [place] : [phone]     [facility]  [address] [facility] [address] [facility]    Other [address] in the area    [facility] (Dental Urgent Care)  [place]  (Across from [facility])  [address] [facility]  [phone]?       Pediatric Only Dentists    [address] [facility] [address]   Up to age 21  [address]  Up to age 24  [place]:

## 2023-10-16 ENCOUNTER — APPOINTMENT (OUTPATIENT)
Dept: CT IMAGING | Age: 43
End: 2023-10-16

## 2023-10-16 ENCOUNTER — HOSPITAL ENCOUNTER (EMERGENCY)
Age: 43
Discharge: HOME | End: 2023-10-16
Attending: EMERGENCY MEDICINE

## 2023-10-16 VITALS
HEIGHT: 66 IN | DIASTOLIC BLOOD PRESSURE: 77 MMHG | HEART RATE: 85 BPM | BODY MASS INDEX: 33.75 KG/M2 | OXYGEN SATURATION: 100 % | SYSTOLIC BLOOD PRESSURE: 128 MMHG | WEIGHT: 210 LBS | RESPIRATION RATE: 16 BRPM | TEMPERATURE: 98.4 F

## 2023-10-16 DIAGNOSIS — K02.9 DENTAL CARIES: ICD-10-CM

## 2023-10-16 DIAGNOSIS — R03.0 ELEVATED BLOOD PRESSURE READING: ICD-10-CM

## 2023-10-16 DIAGNOSIS — L03.211 FACIAL CELLULITIS: ICD-10-CM

## 2023-10-16 DIAGNOSIS — K04.7 DENTAL INFECTION: Primary | ICD-10-CM

## 2023-10-16 LAB
ALBUMIN SERPL-MCNC: 4 G/DL (ref 3.4–5)
ALBUMIN/GLOB SERPL: 1.3 {RATIO} (ref 1.1–2.2)
ALP SERPL-CCNC: 52 U/L (ref 40–129)
ALT SERPL-CCNC: 9 U/L (ref 10–40)
ANION GAP SERPL CALCULATED.3IONS-SCNC: 9 MMOL/L (ref 3–16)
AST SERPL-CCNC: 14 U/L (ref 15–37)
BASOPHILS # BLD: 0.1 K/UL (ref 0–0.2)
BASOPHILS NFR BLD: 0.6 %
BILIRUB SERPL-MCNC: 0.4 MG/DL (ref 0–1)
BUN SERPL-MCNC: 17 MG/DL (ref 7–20)
CALCIUM SERPL-MCNC: 9.4 MG/DL (ref 8.3–10.6)
CHLORIDE SERPL-SCNC: 100 MMOL/L (ref 99–110)
CO2 SERPL-SCNC: 24 MMOL/L (ref 21–32)
CREAT SERPL-MCNC: 0.6 MG/DL (ref 0.6–1.1)
DEPRECATED RDW RBC AUTO: 13.1 % (ref 12.4–15.4)
EOSINOPHIL # BLD: 0.3 K/UL (ref 0–0.6)
EOSINOPHIL NFR BLD: 3 %
GFR SERPLBLD CREATININE-BSD FMLA CKD-EPI: >60 ML/MIN/{1.73_M2}
GLUCOSE SERPL-MCNC: 99 MG/DL (ref 70–99)
HCT VFR BLD AUTO: 37.9 % (ref 36–48)
HGB BLD-MCNC: 12.9 G/DL (ref 12–16)
LYMPHOCYTES # BLD: 1.7 K/UL (ref 1–5.1)
LYMPHOCYTES NFR BLD: 19.2 %
MCH RBC QN AUTO: 28.3 PG (ref 26–34)
MCHC RBC AUTO-ENTMCNC: 33.9 G/DL (ref 31–36)
MCV RBC AUTO: 83.4 FL (ref 80–100)
MONOCYTES # BLD: 0.9 K/UL (ref 0–1.3)
MONOCYTES NFR BLD: 9.6 %
NEUTROPHILS # BLD: 6.1 K/UL (ref 1.7–7.7)
NEUTROPHILS NFR BLD: 67.6 %
PLATELET # BLD AUTO: 359 K/UL (ref 135–450)
PMV BLD AUTO: 7.3 FL (ref 5–10.5)
POTASSIUM SERPL-SCNC: 3.9 MMOL/L (ref 3.5–5.1)
PROT SERPL-MCNC: 7 G/DL (ref 6.4–8.2)
RBC # BLD AUTO: 4.54 M/UL (ref 4–5.2)
SODIUM SERPL-SCNC: 133 MMOL/L (ref 136–145)
WBC # BLD AUTO: 9 K/UL (ref 4–11)

## 2023-10-16 PROCEDURE — 85025 COMPLETE CBC W/AUTO DIFF WBC: CPT

## 2023-10-16 PROCEDURE — 6360000004 HC RX CONTRAST MEDICATION: Performed by: EMERGENCY MEDICINE

## 2023-10-16 PROCEDURE — 6360000002 HC RX W HCPCS

## 2023-10-16 PROCEDURE — 96372 THER/PROPH/DIAG INJ SC/IM: CPT

## 2023-10-16 PROCEDURE — 80053 COMPREHEN METABOLIC PANEL: CPT

## 2023-10-16 PROCEDURE — 6360000002 HC RX W HCPCS: Performed by: EMERGENCY MEDICINE

## 2023-10-16 PROCEDURE — 36415 COLL VENOUS BLD VENIPUNCTURE: CPT

## 2023-10-16 PROCEDURE — 99285 EMERGENCY DEPT VISIT HI MDM: CPT

## 2023-10-16 PROCEDURE — 96374 THER/PROPH/DIAG INJ IV PUSH: CPT

## 2023-10-16 PROCEDURE — 70487 CT MAXILLOFACIAL W/DYE: CPT

## 2023-10-16 RX ORDER — KETOROLAC TROMETHAMINE 30 MG/ML
15 INJECTION, SOLUTION INTRAMUSCULAR; INTRAVENOUS ONCE
Status: COMPLETED | OUTPATIENT
Start: 2023-10-16 | End: 2023-10-16

## 2023-10-16 RX ORDER — KETOROLAC TROMETHAMINE 30 MG/ML
INJECTION, SOLUTION INTRAMUSCULAR; INTRAVENOUS
Status: COMPLETED
Start: 2023-10-16 | End: 2023-10-16

## 2023-10-16 RX ORDER — DROPERIDOL 2.5 MG/ML
1.25 INJECTION, SOLUTION INTRAMUSCULAR; INTRAVENOUS EVERY 6 HOURS PRN
Status: DISCONTINUED | OUTPATIENT
Start: 2023-10-16 | End: 2023-10-16 | Stop reason: HOSPADM

## 2023-10-16 RX ADMIN — KETOROLAC TROMETHAMINE 15 MG: 30 INJECTION, SOLUTION INTRAMUSCULAR; INTRAVENOUS at 06:43

## 2023-10-16 RX ADMIN — DROPERIDOL 1.25 MG: 2.5 INJECTION, SOLUTION INTRAMUSCULAR; INTRAVENOUS at 06:46

## 2023-10-16 RX ADMIN — IOPAMIDOL 75 ML: 755 INJECTION, SOLUTION INTRAVENOUS at 07:24

## 2023-10-16 RX ADMIN — KETOROLAC TROMETHAMINE 15 MG: 30 INJECTION, SOLUTION INTRAMUSCULAR at 06:43

## 2023-10-16 ASSESSMENT — ENCOUNTER SYMPTOMS
ALLERGIC/IMMUNOLOGIC NEGATIVE: 1
VOICE CHANGE: 0
TROUBLE SWALLOWING: 0
GASTROINTESTINAL NEGATIVE: 1
EYES NEGATIVE: 1
RESPIRATORY NEGATIVE: 1

## 2023-10-16 ASSESSMENT — PAIN SCALES - GENERAL
PAINLEVEL_OUTOF10: 10
PAINLEVEL_OUTOF10: 2

## 2023-10-16 ASSESSMENT — PAIN - FUNCTIONAL ASSESSMENT: PAIN_FUNCTIONAL_ASSESSMENT: 0-10

## 2023-10-16 NOTE — DISCHARGE INSTRUCTIONS
Take Tylenol or ibuprofen as needed for pain. Continue to take clindamycin as prescribed due to concern for infection. Call your dentist this week for urgent evaluation and see about tooth extractions. Have your blood pressure rechecked in the next couple of days and if still elevated, you may need medication for this. Talk to your primary doctor about this. If you develop any worsening facial pain or swelling with visual changes, new severe headache, chest pain, strokelike symptoms, or any other concerns over the next 12 to 24 hours, then return to the emergency department immediately for further evaluation. DISPLAY PLAN FREE TEXT

## 2023-10-16 NOTE — ED PROVIDER NOTES
[address] [address] ED  EMERGENCY DEPARTMENT ENCOUNTER      Pt Name: Alena You  MRN: [id number]  [address] [facility] 1980  Date of evaluation: 10/16/2023  Provider: Irena Morales MD    CHIEF COMPLAINT       Chief Complaint   Patient presents with    Dental Pain     Was seen yesterday for similar complaints and sent home with antibiotics and lidocaine. Pt is taking meds as prescribes and feels shes worse than she was before. HISTORY OF PRESENT ILLNESS   (Location/Symptom, Timing/Onset, Context/Setting, Quality, Duration, Modifying Factors, Severity)  Note limiting factors. Alena You is a 38 y.o. female who presents to the emergency department due to pain over the left upper teeth. She states that the pain started about 2 days ago and she came to the emergency department where she was given clindamycin and lidocaine mouthwash. She states that she has been taking these medications and now only has her pain getting worse but she is starting to feel swelling over her left face extending up towards her eye as well as swelling to her hard palate. Nursing Notes were reviewed. REVIEW OF SYSTEMS    (2-9 systems for level 4, 10 or more for level 5)     Review of Systems   Constitutional:  Positive for fever. Patient reports \"low-grade fever\" yesterday at 80   HENT:  Positive for dental problem. Negative for congestion, trouble swallowing and voice change. Facial swelling and palate swelling   Eyes: Negative. Respiratory: Negative. Cardiovascular: Negative. Gastrointestinal: Negative. Endocrine: Negative. Genitourinary: Negative. Musculoskeletal: Negative. Skin: Negative. Allergic/Immunologic: Negative. Neurological: Negative. Hematological: Negative. Psychiatric/Behavioral: Negative. Except as noted above the remainder of the review of systems was reviewed and negative.        PAST MEDICAL HISTORY     Past Medical History:

## 2023-10-16 NOTE — ED PROVIDER NOTES
Patient was signed out to me by Dr. Eugenia George at 4823 to follow up on CT results. Briefly, she started complaining of dental pain to the upper teeth on Saturday evening and was ultimately seen yesterday in the emergency department and started on clindamycin but reported feeling worse over the night which prompted her to come back to the emergency department due to concern for facial swelling. By time I was asked to see the patient and her CT results came back, she finally stated the swelling folic it was coming down and her pain improved. She denies any visual changes. She was taking Tylenol and ibuprofen as needed for pain. She denies any chest pain or shortness of breath. No headache. No numbness or weakness in the arms or legs. Other than concern for dental infection, she has no other complaints at this time and was feeling much better by the time her CT results came back. Her last menstrual cycle was October 1, 2023. She denies any abdominal pain. No trouble swallowing. Physical:   Gen: No acute distress. Alert  Psych: Normal mood and affect  HEENT: NCAT, EOMI, PERRL, MMM, mild tenderness to palpation to left lateral left incisor, no abscess seen, mild tenderness to left maxillary facial region but no overlying cellulitis or erythema at this time  Neck: supple, normal range of motion, nontender to palpation  Cardiac: RRR  Lungs: C2AB, no R/R/W  Abdomen: soft and nontender with no R/D/G  Neuro: Moving all extremities equally, GCS equals 15        MDM: Patient was evaluated due to concern for dental infection but denying any chest pain or other systemic symptoms and reporting that she had obvious facial swelling earlier today but that was improving by the time I saw her. CT showed left lateral maxillary incisor infection but no abscess and I did not see any abscess on exam.  She did report the antibiotic was finally starting to work and therefore recommended continuing this.   She does take Tylenol or

## 2023-11-11 NOTE — ED PROVIDER NOTES
Goal Outcome Evaluation:                         I did not perform a face-to-face evaluation of the patient. I did interpret the patient's EKG as noted below: The Ekg interpreted by me shows  sinus tachycardia, suwv=923   Axis is   Normal  QTc is  normal  Intervals and Durations are unremarkable. ST Segments: nonspecific changes  No prior ECG available for comparison.             Mo Salas MD  01/26/22 7119

## 2024-06-10 ENCOUNTER — APPOINTMENT (OUTPATIENT)
Dept: CT IMAGING | Age: 44
End: 2024-06-10

## 2024-06-10 ENCOUNTER — HOSPITAL ENCOUNTER (EMERGENCY)
Age: 44
Discharge: HOME | End: 2024-06-10
Attending: STUDENT IN AN ORGANIZED HEALTH CARE EDUCATION/TRAINING PROGRAM

## 2024-06-10 VITALS
TEMPERATURE: 99.5 F | RESPIRATION RATE: 16 BRPM | WEIGHT: 218.8 LBS | SYSTOLIC BLOOD PRESSURE: 123 MMHG | BODY MASS INDEX: 35.32 KG/M2 | HEART RATE: 89 BPM | DIASTOLIC BLOOD PRESSURE: 64 MMHG | OXYGEN SATURATION: 100 %

## 2024-06-10 DIAGNOSIS — J02.9 ACUTE PHARYNGITIS, UNSPECIFIED ETIOLOGY: Primary | ICD-10-CM

## 2024-06-10 LAB
ANION GAP SERPL CALCULATED.3IONS-SCNC: 9 MMOL/L (ref 3–16)
BASOPHILS # BLD: 0.1 K/UL (ref 0–0.2)
BASOPHILS NFR BLD: 0.5 %
BUN SERPL-MCNC: 7 MG/DL (ref 7–20)
CALCIUM SERPL-MCNC: 8.8 MG/DL (ref 8.3–10.6)
CHLORIDE SERPL-SCNC: 103 MMOL/L (ref 99–110)
CO2 SERPL-SCNC: 24 MMOL/L (ref 21–32)
CREAT SERPL-MCNC: 0.6 MG/DL (ref 0.6–1.1)
DEPRECATED RDW RBC AUTO: 13.3 % (ref 12.4–15.4)
EOSINOPHIL # BLD: 0.2 K/UL (ref 0–0.6)
EOSINOPHIL NFR BLD: 0.9 %
FLUAV RNA RESP QL NAA+PROBE: NOT DETECTED
FLUBV RNA RESP QL NAA+PROBE: NOT DETECTED
GFR SERPLBLD CREATININE-BSD FMLA CKD-EPI: >90 ML/MIN/{1.73_M2}
GLUCOSE SERPL-MCNC: 91 MG/DL (ref 70–99)
HCG SERPL QL: NEGATIVE
HCT VFR BLD AUTO: 38.1 % (ref 36–48)
HETEROPH AB BLD QL IA: NEGATIVE
HGB BLD-MCNC: 12.7 G/DL (ref 12–16)
LACTATE BLDV-SCNC: 0.9 MMOL/L (ref 0.4–2)
LYMPHOCYTES # BLD: 1.4 K/UL (ref 1–5.1)
LYMPHOCYTES NFR BLD: 7.6 %
MCH RBC QN AUTO: 28 PG (ref 26–34)
MCHC RBC AUTO-ENTMCNC: 33.3 G/DL (ref 31–36)
MCV RBC AUTO: 84.2 FL (ref 80–100)
MONOCYTES # BLD: 1.2 K/UL (ref 0–1.3)
MONOCYTES NFR BLD: 6.4 %
NEUTROPHILS # BLD: 15.7 K/UL (ref 1.7–7.7)
NEUTROPHILS NFR BLD: 84.6 %
PLATELET # BLD AUTO: 413 K/UL (ref 135–450)
PMV BLD AUTO: 7.5 FL (ref 5–10.5)
POTASSIUM SERPL-SCNC: 3.4 MMOL/L (ref 3.5–5.1)
RBC # BLD AUTO: 4.52 M/UL (ref 4–5.2)
S PYO AG THROAT QL: NEGATIVE
SARS-COV-2 RNA RESP QL NAA+PROBE: NOT DETECTED
SODIUM SERPL-SCNC: 136 MMOL/L (ref 136–145)
WBC # BLD AUTO: 18.6 K/UL (ref 4–11)

## 2024-06-10 PROCEDURE — 84703 CHORIONIC GONADOTROPIN ASSAY: CPT

## 2024-06-10 PROCEDURE — 99285 EMERGENCY DEPT VISIT HI MDM: CPT

## 2024-06-10 PROCEDURE — 6370000000 HC RX 637 (ALT 250 FOR IP): Performed by: STUDENT IN AN ORGANIZED HEALTH CARE EDUCATION/TRAINING PROGRAM

## 2024-06-10 PROCEDURE — 87040 BLOOD CULTURE FOR BACTERIA: CPT

## 2024-06-10 PROCEDURE — 70491 CT SOFT TISSUE NECK W/DYE: CPT

## 2024-06-10 PROCEDURE — 87081 CULTURE SCREEN ONLY: CPT

## 2024-06-10 PROCEDURE — 87880 STREP A ASSAY W/OPTIC: CPT

## 2024-06-10 PROCEDURE — 6360000004 HC RX CONTRAST MEDICATION: Performed by: STUDENT IN AN ORGANIZED HEALTH CARE EDUCATION/TRAINING PROGRAM

## 2024-06-10 PROCEDURE — 86308 HETEROPHILE ANTIBODY SCREEN: CPT

## 2024-06-10 PROCEDURE — 85025 COMPLETE CBC W/AUTO DIFF WBC: CPT

## 2024-06-10 PROCEDURE — 36415 COLL VENOUS BLD VENIPUNCTURE: CPT

## 2024-06-10 PROCEDURE — 6360000002 HC RX W HCPCS: Performed by: STUDENT IN AN ORGANIZED HEALTH CARE EDUCATION/TRAINING PROGRAM

## 2024-06-10 PROCEDURE — 2580000003 HC RX 258: Performed by: STUDENT IN AN ORGANIZED HEALTH CARE EDUCATION/TRAINING PROGRAM

## 2024-06-10 PROCEDURE — 83605 ASSAY OF LACTIC ACID: CPT

## 2024-06-10 PROCEDURE — 96360 HYDRATION IV INFUSION INIT: CPT

## 2024-06-10 PROCEDURE — 87636 SARSCOV2 & INF A&B AMP PRB: CPT

## 2024-06-10 PROCEDURE — 80048 BASIC METABOLIC PNL TOTAL CA: CPT

## 2024-06-10 RX ORDER — CLINDAMYCIN HYDROCHLORIDE 150 MG/1
300 CAPSULE ORAL ONCE
Status: COMPLETED | OUTPATIENT
Start: 2024-06-10 | End: 2024-06-10

## 2024-06-10 RX ORDER — DEXAMETHASONE SODIUM PHOSPHATE 10 MG/ML
10 INJECTION, SOLUTION INTRAMUSCULAR; INTRAVENOUS ONCE
Status: COMPLETED | OUTPATIENT
Start: 2024-06-10 | End: 2024-06-10

## 2024-06-10 RX ORDER — KETOROLAC TROMETHAMINE 15 MG/ML
15 INJECTION, SOLUTION INTRAMUSCULAR; INTRAVENOUS ONCE
Status: DISCONTINUED | OUTPATIENT
Start: 2024-06-10 | End: 2024-06-10

## 2024-06-10 RX ORDER — ACETAMINOPHEN 325 MG/1
325 TABLET ORAL ONCE
Status: COMPLETED | OUTPATIENT
Start: 2024-06-10 | End: 2024-06-10

## 2024-06-10 RX ORDER — CLINDAMYCIN HYDROCHLORIDE 300 MG/1
300 CAPSULE ORAL 3 TIMES DAILY
Qty: 30 CAPSULE | Refills: 0 | Status: SHIPPED | OUTPATIENT
Start: 2024-06-10 | End: 2024-06-20

## 2024-06-10 RX ORDER — SODIUM CHLORIDE, SODIUM LACTATE, POTASSIUM CHLORIDE, AND CALCIUM CHLORIDE .6; .31; .03; .02 G/100ML; G/100ML; G/100ML; G/100ML
500 INJECTION, SOLUTION INTRAVENOUS ONCE
Status: DISCONTINUED | OUTPATIENT
Start: 2024-06-10 | End: 2024-06-10 | Stop reason: HOSPADM

## 2024-06-10 RX ORDER — SODIUM CHLORIDE, SODIUM LACTATE, POTASSIUM CHLORIDE, AND CALCIUM CHLORIDE .6; .31; .03; .02 G/100ML; G/100ML; G/100ML; G/100ML
1000 INJECTION, SOLUTION INTRAVENOUS ONCE
Status: COMPLETED | OUTPATIENT
Start: 2024-06-10 | End: 2024-06-10

## 2024-06-10 RX ADMIN — IOPAMIDOL 75 ML: 755 INJECTION, SOLUTION INTRAVENOUS at 06:26

## 2024-06-10 RX ADMIN — CLINDAMYCIN HYDROCHLORIDE 300 MG: 150 CAPSULE ORAL at 07:07

## 2024-06-10 RX ADMIN — DEXAMETHASONE SODIUM PHOSPHATE 10 MG: 10 INJECTION, SOLUTION INTRAMUSCULAR; INTRAVENOUS at 05:50

## 2024-06-10 RX ADMIN — SODIUM CHLORIDE, POTASSIUM CHLORIDE, SODIUM LACTATE AND CALCIUM CHLORIDE 1000 ML: 600; 310; 30; 20 INJECTION, SOLUTION INTRAVENOUS at 05:49

## 2024-06-10 RX ADMIN — ACETAMINOPHEN 325 MG: 325 TABLET ORAL at 05:50

## 2024-06-10 ASSESSMENT — PAIN SCALES - GENERAL
PAINLEVEL_OUTOF10: 4
PAINLEVEL_OUTOF10: 5

## 2024-06-10 NOTE — DISCHARGE INSTRUCTIONS
Follow-up with primary doctor soon as able for reevaluation.  Call them this morning to set up follow-up appointment.  If you continue to have tonsillitis/pharyngitis I do suggest you follow-up with ENT.  Take antibiotics as prescribed.  You must return to emergency department if you begin having any difficulty swallowing or handling secretions shortness of breath worsening swelling or any new changing or worsening symptoms were always here for reevaluation never hesitate to return.  We did discuss that with your borderline fever and tachycardia on presentation as well as white count you do meet criteria for sepsis.  We have ordered blood cultures.  Clinically you are well-appearing and in shared decision making we have decided against admission but please return if you have any worsening symptoms

## 2024-06-13 LAB — S PYO THROAT QL CULT: NORMAL

## 2024-06-14 LAB
BACTERIA BLD CULT ORG #2: NORMAL
BACTERIA BLD CULT: NORMAL

## 2024-06-17 NOTE — ED PROVIDER NOTES
Comment: Please note this report has been produced using speech recognition software and may contain errors related to that system including errors in grammar, punctuation, and spelling, as well as words and phrases that may be inappropriate.  Efforts were made to edit the dictations.        Ricardo Shah MD  06/17/24 1122